# Patient Record
Sex: MALE | Race: WHITE | NOT HISPANIC OR LATINO
[De-identification: names, ages, dates, MRNs, and addresses within clinical notes are randomized per-mention and may not be internally consistent; named-entity substitution may affect disease eponyms.]

---

## 2021-04-16 ENCOUNTER — TRANSCRIPTION ENCOUNTER (OUTPATIENT)
Age: 71
End: 2021-04-16

## 2021-10-04 ENCOUNTER — TRANSCRIPTION ENCOUNTER (OUTPATIENT)
Age: 71
End: 2021-10-04

## 2021-10-15 ENCOUNTER — TRANSCRIPTION ENCOUNTER (OUTPATIENT)
Age: 71
End: 2021-10-15

## 2022-04-10 ENCOUNTER — TRANSCRIPTION ENCOUNTER (OUTPATIENT)
Age: 72
End: 2022-04-10

## 2022-04-15 ENCOUNTER — APPOINTMENT (OUTPATIENT)
Dept: CT IMAGING | Facility: HOSPITAL | Age: 72
End: 2022-04-15

## 2022-04-20 ENCOUNTER — APPOINTMENT (OUTPATIENT)
Dept: ORTHOPEDIC SURGERY | Facility: CLINIC | Age: 72
End: 2022-04-20

## 2022-05-04 ENCOUNTER — OUTPATIENT (OUTPATIENT)
Dept: OUTPATIENT SERVICES | Facility: HOSPITAL | Age: 72
LOS: 1 days | End: 2022-05-04
Payer: MEDICARE

## 2022-05-04 ENCOUNTER — APPOINTMENT (OUTPATIENT)
Dept: MRI IMAGING | Facility: CLINIC | Age: 72
End: 2022-05-04
Payer: MEDICARE

## 2022-05-04 DIAGNOSIS — Z00.00 ENCOUNTER FOR GENERAL ADULT MEDICAL EXAMINATION WITHOUT ABNORMAL FINDINGS: ICD-10-CM

## 2022-05-04 PROCEDURE — 72141 MRI NECK SPINE W/O DYE: CPT | Mod: 26,MH

## 2022-05-04 PROCEDURE — 72141 MRI NECK SPINE W/O DYE: CPT | Mod: MH

## 2022-05-14 ENCOUNTER — NON-APPOINTMENT (OUTPATIENT)
Age: 72
End: 2022-05-14

## 2022-06-19 ENCOUNTER — NON-APPOINTMENT (OUTPATIENT)
Age: 72
End: 2022-06-19

## 2022-10-02 ENCOUNTER — NON-APPOINTMENT (OUTPATIENT)
Age: 72
End: 2022-10-02

## 2022-11-30 ENCOUNTER — NON-APPOINTMENT (OUTPATIENT)
Age: 72
End: 2022-11-30

## 2023-01-30 ENCOUNTER — NON-APPOINTMENT (OUTPATIENT)
Age: 73
End: 2023-01-30

## 2023-01-31 ENCOUNTER — NON-APPOINTMENT (OUTPATIENT)
Age: 73
End: 2023-01-31

## 2023-03-22 ENCOUNTER — NON-APPOINTMENT (OUTPATIENT)
Age: 73
End: 2023-03-22

## 2023-03-27 ENCOUNTER — APPOINTMENT (OUTPATIENT)
Dept: ORTHOPEDIC SURGERY | Facility: CLINIC | Age: 73
End: 2023-03-27
Payer: MEDICARE

## 2023-03-27 VITALS — HEIGHT: 71 IN | BODY MASS INDEX: 24.64 KG/M2 | WEIGHT: 176 LBS

## 2023-03-27 VITALS — SYSTOLIC BLOOD PRESSURE: 130 MMHG | DIASTOLIC BLOOD PRESSURE: 75 MMHG

## 2023-03-27 PROCEDURE — 99204 OFFICE O/P NEW MOD 45 MIN: CPT

## 2023-03-27 RX ORDER — DICLOFENAC SODIUM 75 MG/1
75 TABLET, DELAYED RELEASE ORAL
Qty: 60 | Refills: 1 | Status: ACTIVE | COMMUNITY
Start: 2023-03-27 | End: 1900-01-01

## 2023-03-27 NOTE — ADDENDUM
[FreeTextEntry1] : This note was written by Alejandro Motley on 03/27/2023 acting as scribe for Dr. Parker Burgess M.D.\par \par I, Parker Burgess MD, have read and attest that all the information, medical decision making and discharge instructions within are true and accurate. 
[facility]

## 2023-03-27 NOTE — PHYSICAL EXAM
[Normal] : Gait: normal [Martinez's Sign] : negative Martinez's sign [Pronator Drift] : negative pronator drift [SLR] : negative straight leg raise [de-identified] : 5 out of 5 motor strength,sensation is intact and symmetrical full range of motion flexion extension and rotation, no palpatory tenderness full range of motion of hips knees shoulders and elbows (all four extremities), no atrophy, negative straight leg raise, no pathological reflexes, no swelling, normal ambulation, no apparent distress skin intact, no palpable lymph nodes, no upper or lower extremity instability, alert and oriented x 3 and normal mood. Normal finger-to nose test.\par 45 degrees left and right cervical rotation.\par No upper motor neuron findings. [de-identified] : I reviewed, interpreted and clinically correlated the following outside imaging studies, \par MR SPINE CERVICAL - ORDERED BY: ISA VILA\par \par PROCEDURE DATE: 05/04/2022\par \par INTERPRETATION: EXAMINATION:MR CERVICAL SPINE\par \par CLINICAL INDICATION:Pain, stiffness, and difficulty sleeping\par \par TECHNIQUE: Multi-planar, multi-sequence MR of the cervical spine was performed without intravenous contrast.\par \par COMPARISON: None.\par \par INTERPRETATION:\par \par BONES AND BONE MARROW: There is C6-7 endplate discogenic edema. There is a hemangioma within the dens.\par INTERVERTEBRAL DISCS: There is disc degeneration with loss of disc height at C6-7.\par ALIGNMENT: The craniocervical junction and atlantoaxial intervals are maintained. The spinal alignment is maintained.\par SPINAL CORD: The spinal cord is normal in size and signal intensity.\par EXTRASPINAL: There is no prevertebral soft tissue swelling. There is no epidural mass or fluid collection. The paraspinal and included extraspinal soft tissues are unremarkable.\par \par Evaluation of individual disc space levels demonstrates the following:\par \par C2-C3: Posterior disc osteophyte complex eccentric to the left with left uncovertebral spurring and mild left neural foraminal stenosis. No central stenosis.\par C3-C4: Posterior disc osteophyte complex eccentric to the left with moderate left facet arthropathy and uncovertebral spurring. Moderate left neural foraminal stenosis. No central stenosis..\par C4-C5: Posterior disc osteophyte complex eccentric to the right with moderate right neural foraminal stenosis. Mild left neural foraminal stenosis. Mild central stenosis.\par C5-C6: Posterior disc osteophyte complex eccentric to the right with moderate right neural foraminal stenosis. Mild left neural foraminal stenosis. Moderate central stenosis.\par C6-C7: Broad-based posterior disc osteophyte complex with mild bilateral neural foraminal stenosis. Moderate central stenosis.\par C7-T1: There is no significant spinal canal or neural foraminal stenosis.\par \par IMPRESSION:\par Multilevel cervical spondylosis with moderate central canal stenosis at C5-6 and C6-7 and mild central canal stenosis at C4-5.\par \par Mild to moderate multilevel neural foraminal stenosis as above.\par \par --- End of Report ---\par \par \par \par
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
## 2023-03-27 NOTE — DISCUSSION/SUMMARY
[de-identified] : Cervical degenerative disc disease.\par Cervical strain and sprain.\par Discussed all options.\par Diclofenac PRN.\par Cervical MRI referral.\par F/U after MRI.\par All options discussed including rest, medicine, home exercise, acupuncture, Chiropractic care, Physical Therapy, Pain management, and last resort surgery. All questions were answered, all alternatives discussed and the patient is in complete agreement with the treatment plan which the patient contributed to and discussed with me through the shared decision making process. Follow-up appointment as instructed. Any issues and the patient will call or come in sooner.

## 2023-03-30 ENCOUNTER — APPOINTMENT (OUTPATIENT)
Dept: MRI IMAGING | Facility: HOSPITAL | Age: 73
End: 2023-03-30
Payer: MEDICARE

## 2023-03-30 ENCOUNTER — OUTPATIENT (OUTPATIENT)
Dept: OUTPATIENT SERVICES | Facility: HOSPITAL | Age: 73
LOS: 1 days | End: 2023-03-30
Payer: MEDICARE

## 2023-03-30 DIAGNOSIS — Z00.00 ENCOUNTER FOR GENERAL ADULT MEDICAL EXAMINATION WITHOUT ABNORMAL FINDINGS: ICD-10-CM

## 2023-03-30 PROCEDURE — 72141 MRI NECK SPINE W/O DYE: CPT

## 2023-03-30 PROCEDURE — 72141 MRI NECK SPINE W/O DYE: CPT | Mod: 26,MH

## 2023-04-10 ENCOUNTER — APPOINTMENT (OUTPATIENT)
Dept: ORTHOPEDIC SURGERY | Facility: CLINIC | Age: 73
End: 2023-04-10
Payer: MEDICARE

## 2023-04-10 VITALS — BODY MASS INDEX: 24.78 KG/M2 | WEIGHT: 177 LBS | HEIGHT: 71 IN

## 2023-04-10 DIAGNOSIS — M47.812 SPONDYLOSIS W/OUT MYELOPATHY OR RADICULOPATHY, CERVICAL REGION: ICD-10-CM

## 2023-04-10 PROCEDURE — 99214 OFFICE O/P EST MOD 30 MIN: CPT

## 2023-08-04 ENCOUNTER — NON-APPOINTMENT (OUTPATIENT)
Age: 73
End: 2023-08-04

## 2023-08-07 ENCOUNTER — APPOINTMENT (OUTPATIENT)
Dept: RADIOLOGY | Facility: HOSPITAL | Age: 73
End: 2023-08-07
Payer: MEDICARE

## 2023-08-07 ENCOUNTER — OUTPATIENT (OUTPATIENT)
Dept: OUTPATIENT SERVICES | Facility: HOSPITAL | Age: 73
LOS: 1 days | End: 2023-08-07
Payer: MEDICARE

## 2023-08-07 DIAGNOSIS — M25.521 PAIN IN RIGHT ELBOW: ICD-10-CM

## 2023-08-07 PROCEDURE — 73080 X-RAY EXAM OF ELBOW: CPT

## 2023-08-07 PROCEDURE — 73080 X-RAY EXAM OF ELBOW: CPT | Mod: 26,RT

## 2023-08-10 ENCOUNTER — NON-APPOINTMENT (OUTPATIENT)
Age: 73
End: 2023-08-10

## 2023-09-20 ENCOUNTER — NON-APPOINTMENT (OUTPATIENT)
Age: 73
End: 2023-09-20

## 2023-09-21 ENCOUNTER — EMERGENCY (EMERGENCY)
Facility: HOSPITAL | Age: 73
LOS: 1 days | Discharge: HOME | End: 2023-09-21
Attending: EMERGENCY MEDICINE | Admitting: EMERGENCY MEDICINE
Payer: MEDICARE

## 2023-09-21 VITALS
SYSTOLIC BLOOD PRESSURE: 143 MMHG | HEIGHT: 71 IN | DIASTOLIC BLOOD PRESSURE: 83 MMHG | OXYGEN SATURATION: 95 % | HEART RATE: 64 BPM | RESPIRATION RATE: 18 BRPM | WEIGHT: 173.06 LBS | TEMPERATURE: 98 F

## 2023-09-21 PROCEDURE — 70450 CT HEAD/BRAIN W/O DYE: CPT | Mod: 26,MA

## 2023-09-21 PROCEDURE — 99284 EMERGENCY DEPT VISIT MOD MDM: CPT

## 2023-09-21 PROCEDURE — 70450 CT HEAD/BRAIN W/O DYE: CPT | Mod: MA

## 2023-09-21 PROCEDURE — 72125 CT NECK SPINE W/O DYE: CPT | Mod: MA

## 2023-09-21 PROCEDURE — 72125 CT NECK SPINE W/O DYE: CPT | Mod: 26,MA

## 2023-09-21 PROCEDURE — 99284 EMERGENCY DEPT VISIT MOD MDM: CPT | Mod: 25

## 2023-09-21 RX ORDER — METOCLOPRAMIDE HCL 10 MG
10 TABLET ORAL ONCE
Refills: 0 | Status: COMPLETED | OUTPATIENT
Start: 2023-09-21 | End: 2023-09-21

## 2023-09-21 RX ORDER — ACETAMINOPHEN 500 MG
975 TABLET ORAL ONCE
Refills: 0 | Status: COMPLETED | OUTPATIENT
Start: 2023-09-21 | End: 2023-09-21

## 2023-09-21 RX ADMIN — Medication 10 MILLIGRAM(S): at 14:05

## 2023-09-21 RX ADMIN — Medication 975 MILLIGRAM(S): at 14:05
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
## 2023-09-21 NOTE — ED PROVIDER NOTE - CARE PROVIDER_API CALL
Mauricio Freitas  Otolaryngology  [address] [place] [id number]  Phone: [phone]  Fax: [phone]  Follow Up Time:     Joanne Miner  Orthopaedic Surgery  [address] [facility], [facility] [address]  [place] [address]  Phone: [phone]  Fax: [phone]  Follow Up Time:
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
## 2023-09-21 NOTE — ED PROVIDER NOTE - PATIENT PORTAL LINK FT
You can access the FollowMyHealth Patient Portal offered by [facility] by registering at the following website: [email or web address]. By joining [email or web address]’s [email or web address] portal, you will also be able to view your health information using other applications (apps) compatible with our system.

## 2023-09-21 NOTE — ED ADULT NURSE NOTE - NSFALLUNIVINTERV_ED_ALL_ED
Bed/Stretcher in lowest position, wheels locked, appropriate side rails in place/Call bell, personal items and telephone in reach/Instruct patient to call for assistance before getting out of bed/chair/stretcher/Non-slip footwear applied when patient is off stretcher/[place] to call system/Physically safe environment - no spills, clutter or unnecessary equipment/Purposeful proactive rounding/Room/bathroom lighting operational, light cord in reach

## 2023-09-21 NOTE — ED PROVIDER NOTE - NSFOLLOWUPINSTRUCTIONS_ED_ALL_ED_FT
-- You should update your primary care physician on your Emergency Department visit and follow up with them.  If you do not have a physician or have difficulty following up, please call: [phone] to obtain a [facility] doctor or specialist who can provide follow up.    -- Return to the ER for worsening or persistent symptoms, and/or ANY NEW OR CONCERNING SYMPTOMS. -- You should update your primary care physician on your Emergency Department visit and follow up with them.  If you do not have a physician or have difficulty following up, please call: [phone] to obtain a [facility] doctor or specialist who can provide follow up.    -- You have evidence of sinusitis on your CT scan and arthritis of your neck. Follow up with ENT and ortho spine referrals.    -- Return to the ER for worsening or persistent symptoms, and/or ANY NEW OR CONCERNING SYMPTOMS.

## 2023-11-22 ENCOUNTER — EMERGENCY (EMERGENCY)
Facility: HOSPITAL | Age: 73
LOS: 1 days | Discharge: HOME | End: 2023-11-22
Attending: EMERGENCY MEDICINE | Admitting: EMERGENCY MEDICINE
Payer: MEDICARE

## 2023-11-22 VITALS
RESPIRATION RATE: 18 BRPM | HEART RATE: 68 BPM | TEMPERATURE: 98 F | DIASTOLIC BLOOD PRESSURE: 94 MMHG | SYSTOLIC BLOOD PRESSURE: 186 MMHG | WEIGHT: 175.05 LBS | OXYGEN SATURATION: 97 % | HEIGHT: 72 IN

## 2023-11-22 VITALS
RESPIRATION RATE: 18 BRPM | OXYGEN SATURATION: 98 % | HEART RATE: 72 BPM | DIASTOLIC BLOOD PRESSURE: 86 MMHG | SYSTOLIC BLOOD PRESSURE: 170 MMHG

## 2023-11-22 PROCEDURE — 73130 X-RAY EXAM OF HAND: CPT | Mod: 26,RT

## 2023-11-22 PROCEDURE — 73130 X-RAY EXAM OF HAND: CPT

## 2023-11-22 PROCEDURE — 99284 EMERGENCY DEPT VISIT MOD MDM: CPT

## 2023-11-22 RX ORDER — IBUPROFEN 200 MG
400 TABLET ORAL ONCE
Refills: 0 | Status: COMPLETED | OUTPATIENT
Start: 2023-11-22 | End: 2023-11-22

## 2023-11-22 RX ADMIN — Medication 400 MILLIGRAM(S): at 23:11

## 2023-11-22 NOTE — ED ADULT TRIAGE NOTE - HEART RATE (BEATS/MIN)
Timothy Aceves,    This is to inform you regarding your test result.    Mammogram result is satisfactory .  Recommendation: annual mammography    Sincerely,      Dr.Nasima Maria Luz MD,FACP      
68

## 2023-11-22 NOTE — ED PROVIDER NOTE - NSFOLLOWUPINSTRUCTIONS_ED_ALL_ED_FT
Contusion of hand  A contusion is a deep bruise. Contusions are the result of a blunt injury to tissues and muscle fibers under the skin. The skin overlying the contusion may turn blue, purple, or yellow. Symptoms also include pain and swelling in the injured area.  keep elevated  apply ice  Take Ibuprofen for pain  follow up with PMD     SEEK IMMEDIATE MEDICAL CARE IF YOU HAVE ANY OF THE FOLLOWING SYMPTOMS: severe pain, numbness, tingling, pain, weakness, or skin color/temperature change in any part of your body distal to the injury.

## 2023-11-22 NOTE — ED ADULT NURSE NOTE - NSFALLRISKINTERV_ED_ALL_ED

## 2023-11-22 NOTE — ED ADULT TRIAGE NOTE - CHIEF COMPLAINT QUOTE
Pt tripped and fell c/o right hand injury with pain, denies LOC no head injury no blood thinner, took Tylenol

## 2023-11-22 NOTE — ED PROVIDER NOTE - PHYSICAL EXAMINATION
General:     NAD, well-nourished, well-appearing  Head:     NC/AT, EOMI, oral mucosa moist  Neck:     supple  Lungs:     CTA b/l, no w/r/r  CVS:     S1S2, RRR, no m/g/r  Abd:     +BS, s/nt/nd, no organomegaly  Ext:    2+ radial and pedal pulses, no c/c/e  right hand no gross deformity, edema of medial aspect of hand on dorsal aspect . distal NV intact   Neuro: grossly intact

## 2023-11-22 NOTE — ED ADULT NURSE NOTE - OBJECTIVE STATEMENT
Importance of daily AREDS II study multivitamin and A[employer] Grid checks discussed with patient. Patient to follow-up immediately with any new onset of decreased vision and/or metamorphopsia. Pt presents to the ER complaining of right hand pain with pain and swelling after sustaining a trip and fall onto his outstretched hand aroung 9PM tonight. A&OX4. Pt denies SOB, chest pain, nausea, vomiting, dizziness or headache.

## 2023-11-22 NOTE — ED PROVIDER NOTE - PATIENT PORTAL LINK FT
You can access the FollowMyHealth Patient Portal offered by [facility] by registering at the following website: [email or web address]. By joining [employer]’s FollowMyHealth portal, you will also be able to view your health information using other applications (apps) compatible with our system.

## 2023-11-22 NOTE — ED ADULT TRIAGE NOTE - AS TEMP SITE
States he is concerned that hand maybe infected, states he is having no relief from pain with tylenol and ibuprofen.  
oral

## 2023-11-22 NOTE — ED ADULT NURSE NOTE - SINCE THE ILLNESS OR INJURY
Report received from Russell Medina, [facility]. Introduced myself as primary RN. Assumed care of patient. Instructed patient to call for assistance prior to getting up. Pt verbalized understanding. Call bell within reach. 7:15 PM Patient complaining of increased shortness of breath and requesting for a breathing treatment. RT notified. VS stable. O2 sats, %. Bedside and Verbal shift change report given to Benjamín Gudino (oncoming nurse) by Willy Yin (offgoing nurse). Report included the following information SBAR, Kardex, MAR, Med Rec Status and Cardiac Rhythm NSR. No

## 2023-11-22 NOTE — ED PROVIDER NOTE - CLINICAL SUMMARY MEDICAL DECISION MAKING FREE TEXT BOX
72 y/o male presents to Ed c/o pain in right hand s/p fall, he tripped and fell on outstretched hand, c/o swelling and pain on lat aspect of hand, on exam pt has edema on dorsal aspect right hand on medial aspect, xray negative for any fracture. pt advise cold compress

## 2023-11-22 NOTE — ED PROVIDER NOTE - OBJECTIVE STATEMENT
74 y/o male presents to Ed c/o pain in right hand s/p fall, he tripped and fell on outstretched hand, c/o swelling and pain on lat aspect of hand Unknown

## 2023-11-27 NOTE — CHART NOTE - NSCHARTNOTEFT_GEN_A_CORE
73 y o male presenting to the ED on 11/22 complaining of hand pain/injury.  SW made a follow up call and received no answer.  Contact information was provided via voice mail.

## 2023-12-04 ENCOUNTER — EMERGENCY (EMERGENCY)
Facility: HOSPITAL | Age: 73
LOS: 1 days | Discharge: HOME | End: 2023-12-04
Attending: STUDENT IN AN ORGANIZED HEALTH CARE EDUCATION/TRAINING PROGRAM | Admitting: STUDENT IN AN ORGANIZED HEALTH CARE EDUCATION/TRAINING PROGRAM
Payer: MEDICARE

## 2023-12-04 VITALS
WEIGHT: 175.05 LBS | DIASTOLIC BLOOD PRESSURE: 80 MMHG | HEART RATE: 63 BPM | TEMPERATURE: 98 F | RESPIRATION RATE: 18 BRPM | OXYGEN SATURATION: 96 % | HEIGHT: 72 IN | SYSTOLIC BLOOD PRESSURE: 161 MMHG

## 2023-12-04 VITALS
OXYGEN SATURATION: 97 % | HEART RATE: 57 BPM | SYSTOLIC BLOOD PRESSURE: 160 MMHG | DIASTOLIC BLOOD PRESSURE: 91 MMHG | RESPIRATION RATE: 17 BRPM | TEMPERATURE: 98 F

## 2023-12-04 PROCEDURE — 99284 EMERGENCY DEPT VISIT MOD MDM: CPT | Mod: 25

## 2023-12-04 PROCEDURE — 99284 EMERGENCY DEPT VISIT MOD MDM: CPT

## 2023-12-04 PROCEDURE — 70486 CT MAXILLOFACIAL W/O DYE: CPT | Mod: MA

## 2023-12-04 PROCEDURE — 70486 CT MAXILLOFACIAL W/O DYE: CPT | Mod: 26,MA

## 2023-12-04 NOTE — ED PROVIDER NOTE - CLINICAL SUMMARY MEDICAL DECISION MAKING FREE TEXT BOX
73 presents to the ER after right eye trauma.  On exam he has ecchymosis over the medial aspect of the right eye there is no laceration.  His extraocular movements are intact pupils are also equal round and reactive.  He is endorsing mild   Blurry vision.  Plan to evaluate with CT imaging will perform bedside sono to rule out obvious  traumatic eye pathology.
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
## 2023-12-04 NOTE — ED PROVIDER NOTE - PROGRESS NOTE DETAILS
Ramiro Gutierrez ER Attending   Bedside sonogram shows evidence of possible vitreous hemorrhage there is a small area of echogenicity in the right eye that is not attached to the retina.  spoke with ophthalmology will arrange outpatient evaluation [facility] Gutier[facility] ER Attending   Intraocular pressure 10 right visual acuity 20/25.  Spoke to on-call ophthalmologist provided with patient's insurance and phone number will call patient to schedule eye clinic follow-up as early as tomorrow.  Given return precautions back to the ER for any worsening eye complaints or any new concerning symptoms.

## 2023-12-04 NOTE — ED PROVIDER NOTE - NSFOLLOWUPINSTRUCTIONS_ED_ALL_ED_FT
Facial or Scalp Contusion  A facial or scalp contusion is a bruise (contusion) on the face or head. Contusions are the result of a direct force (blunttrauma) to the face or scalp that has caused bleeding under the skin. The contusion may turn blue, purple, or yellow (discoloration). Minor injuries may cause a painless contusion, but more severe contusions may stay painful and swollen for a few weeks.    Injuries to the face and head generally cause a lot of swelling and discoloration, especially around the eyes. Contusions by themselves are generally not life threatening. You may have a contusion along with other injuries, such as broken bones (fractures), cuts, and injuries to blood vessels.    What are the causes?  A facial or scalp contusion is caused by a injury, fall, or trauma to the face or head area. Contusions may result from:  Motor vehicle accidents.  Sports injuries.  Assault injuries.  What are the signs or symptoms?  Symptoms of this condition include:  Swelling of the injured area. The swelling may be in a small area (localized) and very noticeable.  Discoloration of the injured area.  Tenderness, soreness, or pain in the injured area.  In addition to symptoms of contusions, if you have facial fractures, you may have a change in the appearance of your nose, may not be able to close your mouth, and may have vision changes.    How is this diagnosed?  This condition is diagnosed based on your medical history and a physical exam. An X-ray exam, CT scan, or MRI may be needed to check for any additional injuries. In some cases, your health care provider may need to do more examinations of your nose, eyes, or jaw.    How is this treated?  Often, the best treatment for a facial or scalp contusion is applying cold compresses to the injured area. Over-the-counter medicines may also be recommended to help relieve pain.    If there are any cuts (lacerations), these will need to be repaired as well. Any deeper injuries may require treatment and follow up with a specialist, such as a facial surgeon or an eye specialist (ophthalmologist).    Follow these instructions at home:  Managing pain, stiffness, and swelling    Bag of ice on a towel on the skin.   If directed, put ice on the injured area. To do this:  Put ice in a plastic bag.  Place a towel between your skin and the bag.  Leave the ice on for 20 minutes, 2–3 times a day.  Remove the ice if your skin turns bright red. This is very important. If you cannot feel pain, heat, or cold, you have a greater risk of damage to the area.  Raise (elevate) the injured area above the level of your heart while you are sitting or lying down.  General instructions    Take over-the-counter and prescription medicines only as told by your health care provider.  Rest as told by your health care provider.  Return to your normal activities as told by your health care provider. Ask your health care provider what activities are safe for you.  Do not blow your nose if you have any facial fractures.  Eat soft foods if you are having jaw pain.  Keep all follow-up visits. This is important.  Contact a health care provider if:  You have trouble biting or chewing.  Your pain or swelling gets worse.  The discolored area gets much worse.  Get help right away if:  You have severe pain or a headache that is not relieved by medicine.  You have unusual sleepiness, confusion, or personality changes.  You vomit.  You have a nosebleed that does not stop.  You have double vision or blurred vision.  You have clear fluid draining from your nose or ear, and it does not go away.  You have trouble walking or using your arms or legs.  You have severe dizziness.  Summary  A facial or scalp contusion is a bruise (contusion) on the face or head.  Contusions are the result of an injury that caused bleeding and swelling under the skin.  Minor contusions will have mild symptoms, but more severe contusions may stay painful and swollen for a few weeks.  Some facial and head contusions may be associated with other more serious injuries. Go to a health care provider if you have vision changes, bleeding from your face or nose, or you are not able to to bite down normally.  Often, the best treatment for a facial or scalp contusion is applying cold compresses to the injured area.  This information is not intended to replace advice given to you by your health care provider. Make sure you discuss any questions you have with your health care provider.

## 2023-12-04 NOTE — ED ADULT TRIAGE NOTE - PAIN RATING/NUMBER SCALE (0-10): ACTIVITY
0 (no pain/absence of nonverbal indicators of pain)
Pt arrived to ED c/o asthma exacerbation x few days , probably due to smoke   Used only a pump at home with minimal relief  Pt refused labs and IV HL , refused IV meds  Dr Napier notified , oferred IM steroid but refused, spoke to PMD on the phone and will follow up tomorrow.

## 2023-12-04 NOTE — ED PROVIDER NOTE - PHYSICAL EXAMINATION
Vital signs reviewed  GENERAL: Patient nontoxic appearing, NAD  HEAD: NCAT  EYES:   Pupils equal round and reactive to light extraocular movements are intact right visual acuity 20/30.  ENT: MMM  NECK: Supple, non tender  RESPIRATORY: Normal respiratory effort. CTA B/L. No wheezing, rales, rhonchi  CARDIOVASCULAR: Regular rate and rhythm  ABDOMEN: Soft. Nondistended. Nontender. No guarding or rebound. No CVA tenderness.  MUSCULOSKELETAL/EXTREMITIES: Brisk cap refill. 2+ radial pulses. No leg edema.  SKIN:  Warm and dry  NEURO: AAOx3. No gross FND.  PSYCHIATRIC: Cooperative. Affect appropriate.

## 2023-12-04 NOTE — ED PROVIDER NOTE - PATIENT PORTAL LINK FT
You can access the FollowMyHealth Patient Portal offered by [facility] by registering at the following website: [email or web address]. By joining [employer]’s FollowMyHealth portal, you will also be able to view your health information using other applications (apps) compatible with our system. You can access the FollowMyHealth Patient Portal offered by [facility] by registering at the following website: [email or web address]. By joining [employer]’s FollowMyHealth portal, you will also be able to view your health information using other applications (apps) compatible with our system.

## 2023-12-05 ENCOUNTER — APPOINTMENT (OUTPATIENT)
Dept: OPHTHALMOLOGY | Facility: CLINIC | Age: 73
End: 2023-12-05
Payer: MEDICARE

## 2023-12-05 ENCOUNTER — NON-APPOINTMENT (OUTPATIENT)
Age: 73
End: 2023-12-05

## 2023-12-05 PROCEDURE — 92134 CPTRZ OPH DX IMG PST SGM RTA: CPT

## 2023-12-05 PROCEDURE — 92004 COMPRE OPH EXAM NEW PT 1/>: CPT

## 2023-12-07 ENCOUNTER — EMERGENCY (EMERGENCY)
Facility: HOSPITAL | Age: 73
LOS: 1 days | Discharge: HOME | End: 2023-12-07
Attending: EMERGENCY MEDICINE | Admitting: EMERGENCY MEDICINE
Payer: MEDICARE

## 2023-12-07 VITALS
DIASTOLIC BLOOD PRESSURE: 100 MMHG | RESPIRATION RATE: 18 BRPM | HEIGHT: 72 IN | SYSTOLIC BLOOD PRESSURE: 208 MMHG | WEIGHT: 175.05 LBS | HEART RATE: 67 BPM | OXYGEN SATURATION: 98 % | TEMPERATURE: 98 F

## 2023-12-07 VITALS
HEART RATE: 56 BPM | DIASTOLIC BLOOD PRESSURE: 89 MMHG | RESPIRATION RATE: 16 BRPM | SYSTOLIC BLOOD PRESSURE: 162 MMHG | OXYGEN SATURATION: 97 %

## 2023-12-07 LAB
ALBUMIN SERPL ELPH-MCNC: 3.9 G/DL — SIGNIFICANT CHANGE UP (ref 3.3–5)
ALBUMIN SERPL ELPH-MCNC: 3.9 G/DL — SIGNIFICANT CHANGE UP (ref 3.3–5)
ALP SERPL-CCNC: 79 U/L — SIGNIFICANT CHANGE UP (ref 40–120)
ALP SERPL-CCNC: 79 U/L — SIGNIFICANT CHANGE UP (ref 40–120)
ALT FLD-CCNC: 23 U/L — SIGNIFICANT CHANGE UP (ref 10–45)
ALT FLD-CCNC: 23 U/L — SIGNIFICANT CHANGE UP (ref 10–45)
ANION GAP SERPL CALC-SCNC: 11 MMOL/L — SIGNIFICANT CHANGE UP (ref 5–17)
ANION GAP SERPL CALC-SCNC: 11 MMOL/L — SIGNIFICANT CHANGE UP (ref 5–17)
APPEARANCE UR: CLEAR — SIGNIFICANT CHANGE UP
APPEARANCE UR: CLEAR — SIGNIFICANT CHANGE UP
AST SERPL-CCNC: 18 U/L — SIGNIFICANT CHANGE UP (ref 10–40)
AST SERPL-CCNC: 18 U/L — SIGNIFICANT CHANGE UP (ref 10–40)
BACTERIA # UR AUTO: ABNORMAL /HPF
BACTERIA # UR AUTO: ABNORMAL /HPF
BASOPHILS # BLD AUTO: 0.08 K/UL — SIGNIFICANT CHANGE UP (ref 0–0.2)
BASOPHILS # BLD AUTO: 0.08 K/UL — SIGNIFICANT CHANGE UP (ref 0–0.2)
BASOPHILS NFR BLD AUTO: 1.3 % — SIGNIFICANT CHANGE UP (ref 0–2)
BASOPHILS NFR BLD AUTO: 1.3 % — SIGNIFICANT CHANGE UP (ref 0–2)
BILIRUB SERPL-MCNC: 1 MG/DL — SIGNIFICANT CHANGE UP (ref 0.2–1.2)
BILIRUB SERPL-MCNC: 1 MG/DL — SIGNIFICANT CHANGE UP (ref 0.2–1.2)
BILIRUB UR-MCNC: NEGATIVE — SIGNIFICANT CHANGE UP
BILIRUB UR-MCNC: NEGATIVE — SIGNIFICANT CHANGE UP
BUN SERPL-MCNC: 21 MG/DL — SIGNIFICANT CHANGE UP (ref 7–23)
BUN SERPL-MCNC: 21 MG/DL — SIGNIFICANT CHANGE UP (ref 7–23)
CALCIUM SERPL-MCNC: 9.4 MG/DL — SIGNIFICANT CHANGE UP (ref 8.4–10.5)
CALCIUM SERPL-MCNC: 9.4 MG/DL — SIGNIFICANT CHANGE UP (ref 8.4–10.5)
CHLORIDE SERPL-SCNC: 102 MMOL/L — SIGNIFICANT CHANGE UP (ref 96–108)
CHLORIDE SERPL-SCNC: 102 MMOL/L — SIGNIFICANT CHANGE UP (ref 96–108)
CO2 SERPL-SCNC: 24 MMOL/L — SIGNIFICANT CHANGE UP (ref 22–31)
CO2 SERPL-SCNC: 24 MMOL/L — SIGNIFICANT CHANGE UP (ref 22–31)
COLOR SPEC: YELLOW — SIGNIFICANT CHANGE UP
COLOR SPEC: YELLOW — SIGNIFICANT CHANGE UP
CREAT SERPL-MCNC: 1.16 MG/DL — SIGNIFICANT CHANGE UP (ref 0.5–1.3)
CREAT SERPL-MCNC: 1.16 MG/DL — SIGNIFICANT CHANGE UP (ref 0.5–1.3)
DIFF PNL FLD: ABNORMAL
DIFF PNL FLD: ABNORMAL
EGFR: 66 ML/MIN/1.73M2 — SIGNIFICANT CHANGE UP
EGFR: 66 ML/MIN/1.73M2 — SIGNIFICANT CHANGE UP
EOSINOPHIL # BLD AUTO: 0.15 K/UL — SIGNIFICANT CHANGE UP (ref 0–0.5)
EOSINOPHIL # BLD AUTO: 0.15 K/UL — SIGNIFICANT CHANGE UP (ref 0–0.5)
EOSINOPHIL NFR BLD AUTO: 2.5 % — SIGNIFICANT CHANGE UP (ref 0–6)
EOSINOPHIL NFR BLD AUTO: 2.5 % — SIGNIFICANT CHANGE UP (ref 0–6)
EPI CELLS # UR: 1 — SIGNIFICANT CHANGE UP
EPI CELLS # UR: 1 — SIGNIFICANT CHANGE UP
GLUCOSE SERPL-MCNC: 187 MG/DL — HIGH (ref 70–99)
GLUCOSE SERPL-MCNC: 187 MG/DL — HIGH (ref 70–99)
GLUCOSE UR QL: NEGATIVE MG/DL — SIGNIFICANT CHANGE UP
GLUCOSE UR QL: NEGATIVE MG/DL — SIGNIFICANT CHANGE UP
HCT VFR BLD CALC: 48.4 % — SIGNIFICANT CHANGE UP (ref 39–50)
HCT VFR BLD CALC: 48.4 % — SIGNIFICANT CHANGE UP (ref 39–50)
HGB BLD-MCNC: 16.4 G/DL — SIGNIFICANT CHANGE UP (ref 13–17)
HGB BLD-MCNC: 16.4 G/DL — SIGNIFICANT CHANGE UP (ref 13–17)
IMM GRANULOCYTES NFR BLD AUTO: 0.2 % — SIGNIFICANT CHANGE UP (ref 0–0.9)
IMM GRANULOCYTES NFR BLD AUTO: 0.2 % — SIGNIFICANT CHANGE UP (ref 0–0.9)
KETONES UR-MCNC: NEGATIVE MG/DL — SIGNIFICANT CHANGE UP
KETONES UR-MCNC: NEGATIVE MG/DL — SIGNIFICANT CHANGE UP
LEUKOCYTE ESTERASE UR-ACNC: NEGATIVE — SIGNIFICANT CHANGE UP
LEUKOCYTE ESTERASE UR-ACNC: NEGATIVE — SIGNIFICANT CHANGE UP
LIDOCAIN IGE QN: 58 U/L — SIGNIFICANT CHANGE UP (ref 16–77)
LIDOCAIN IGE QN: 58 U/L — SIGNIFICANT CHANGE UP (ref 16–77)
LYMPHOCYTES # BLD AUTO: 1.06 K/UL — SIGNIFICANT CHANGE UP (ref 1–3.3)
LYMPHOCYTES # BLD AUTO: 1.06 K/UL — SIGNIFICANT CHANGE UP (ref 1–3.3)
LYMPHOCYTES # BLD AUTO: 17.7 % — SIGNIFICANT CHANGE UP (ref 13–44)
LYMPHOCYTES # BLD AUTO: 17.7 % — SIGNIFICANT CHANGE UP (ref 13–44)
MCHC RBC-ENTMCNC: 30.7 PG — SIGNIFICANT CHANGE UP (ref 27–34)
MCHC RBC-ENTMCNC: 30.7 PG — SIGNIFICANT CHANGE UP (ref 27–34)
MCHC RBC-ENTMCNC: 33.9 GM/DL — SIGNIFICANT CHANGE UP (ref 32–36)
MCHC RBC-ENTMCNC: 33.9 GM/DL — SIGNIFICANT CHANGE UP (ref 32–36)
MCV RBC AUTO: 90.5 FL — SIGNIFICANT CHANGE UP (ref 80–100)
MCV RBC AUTO: 90.5 FL — SIGNIFICANT CHANGE UP (ref 80–100)
MONOCYTES # BLD AUTO: 0.48 K/UL — SIGNIFICANT CHANGE UP (ref 0–0.9)
MONOCYTES # BLD AUTO: 0.48 K/UL — SIGNIFICANT CHANGE UP (ref 0–0.9)
MONOCYTES NFR BLD AUTO: 8 % — SIGNIFICANT CHANGE UP (ref 2–14)
MONOCYTES NFR BLD AUTO: 8 % — SIGNIFICANT CHANGE UP (ref 2–14)
NEUTROPHILS # BLD AUTO: 4.21 K/UL — SIGNIFICANT CHANGE UP (ref 1.8–7.4)
NEUTROPHILS # BLD AUTO: 4.21 K/UL — SIGNIFICANT CHANGE UP (ref 1.8–7.4)
NEUTROPHILS NFR BLD AUTO: 70.3 % — SIGNIFICANT CHANGE UP (ref 43–77)
NEUTROPHILS NFR BLD AUTO: 70.3 % — SIGNIFICANT CHANGE UP (ref 43–77)
NITRITE UR-MCNC: NEGATIVE — SIGNIFICANT CHANGE UP
NITRITE UR-MCNC: NEGATIVE — SIGNIFICANT CHANGE UP
NRBC # BLD: 0 /100 WBCS — SIGNIFICANT CHANGE UP (ref 0–0)
NRBC # BLD: 0 /100 WBCS — SIGNIFICANT CHANGE UP (ref 0–0)
PH UR: 5.5 — SIGNIFICANT CHANGE UP (ref 5–8)
PH UR: 5.5 — SIGNIFICANT CHANGE UP (ref 5–8)
PLATELET # BLD AUTO: 188 K/UL — SIGNIFICANT CHANGE UP (ref 150–400)
PLATELET # BLD AUTO: 188 K/UL — SIGNIFICANT CHANGE UP (ref 150–400)
POTASSIUM SERPL-MCNC: 4.3 MMOL/L — SIGNIFICANT CHANGE UP (ref 3.5–5.3)
POTASSIUM SERPL-MCNC: 4.3 MMOL/L — SIGNIFICANT CHANGE UP (ref 3.5–5.3)
POTASSIUM SERPL-SCNC: 4.3 MMOL/L — SIGNIFICANT CHANGE UP (ref 3.5–5.3)
POTASSIUM SERPL-SCNC: 4.3 MMOL/L — SIGNIFICANT CHANGE UP (ref 3.5–5.3)
PROT SERPL-MCNC: 7.4 G/DL — SIGNIFICANT CHANGE UP (ref 6–8.3)
PROT SERPL-MCNC: 7.4 G/DL — SIGNIFICANT CHANGE UP (ref 6–8.3)
PROT UR-MCNC: SIGNIFICANT CHANGE UP MG/DL
PROT UR-MCNC: SIGNIFICANT CHANGE UP MG/DL
RBC # BLD: 5.35 M/UL — SIGNIFICANT CHANGE UP (ref 4.2–5.8)
RBC # BLD: 5.35 M/UL — SIGNIFICANT CHANGE UP (ref 4.2–5.8)
RBC # FLD: 12.8 % — SIGNIFICANT CHANGE UP (ref 10.3–14.5)
RBC # FLD: 12.8 % — SIGNIFICANT CHANGE UP (ref 10.3–14.5)
RBC CASTS # UR COMP ASSIST: 26 /HPF — HIGH (ref 0–4)
RBC CASTS # UR COMP ASSIST: 26 /HPF — HIGH (ref 0–4)
SODIUM SERPL-SCNC: 137 MMOL/L — SIGNIFICANT CHANGE UP (ref 135–145)
SODIUM SERPL-SCNC: 137 MMOL/L — SIGNIFICANT CHANGE UP (ref 135–145)
SP GR SPEC: 1.02 — SIGNIFICANT CHANGE UP (ref 1–1.03)
SP GR SPEC: 1.02 — SIGNIFICANT CHANGE UP (ref 1–1.03)
UROBILINOGEN FLD QL: 0.2 MG/DL — SIGNIFICANT CHANGE UP (ref 0.2–1)
UROBILINOGEN FLD QL: 0.2 MG/DL — SIGNIFICANT CHANGE UP (ref 0.2–1)
WBC # BLD: 5.99 K/UL — SIGNIFICANT CHANGE UP (ref 3.8–10.5)
WBC # BLD: 5.99 K/UL — SIGNIFICANT CHANGE UP (ref 3.8–10.5)
WBC # FLD AUTO: 5.99 K/UL — SIGNIFICANT CHANGE UP (ref 3.8–10.5)
WBC # FLD AUTO: 5.99 K/UL — SIGNIFICANT CHANGE UP (ref 3.8–10.5)
WBC UR QL: 1 /HPF — SIGNIFICANT CHANGE UP (ref 0–5)
WBC UR QL: 1 /HPF — SIGNIFICANT CHANGE UP (ref 0–5)

## 2023-12-07 PROCEDURE — 74174 CTA ABD&PLVS W/CONTRAST: CPT | Mod: MA

## 2023-12-07 PROCEDURE — 96374 THER/PROPH/DIAG INJ IV PUSH: CPT | Mod: XU

## 2023-12-07 PROCEDURE — 36415 COLL VENOUS BLD VENIPUNCTURE: CPT

## 2023-12-07 PROCEDURE — 71275 CT ANGIOGRAPHY CHEST: CPT | Mod: MA

## 2023-12-07 PROCEDURE — 87086 URINE CULTURE/COLONY COUNT: CPT

## 2023-12-07 PROCEDURE — 83690 ASSAY OF LIPASE: CPT

## 2023-12-07 PROCEDURE — 71275 CT ANGIOGRAPHY CHEST: CPT | Mod: 26,MA

## 2023-12-07 PROCEDURE — 81001 URINALYSIS AUTO W/SCOPE: CPT

## 2023-12-07 PROCEDURE — 99285 EMERGENCY DEPT VISIT HI MDM: CPT

## 2023-12-07 PROCEDURE — 96375 TX/PRO/DX INJ NEW DRUG ADDON: CPT | Mod: XU

## 2023-12-07 PROCEDURE — 99284 EMERGENCY DEPT VISIT MOD MDM: CPT | Mod: 25

## 2023-12-07 PROCEDURE — 85025 COMPLETE CBC W/AUTO DIFF WBC: CPT

## 2023-12-07 PROCEDURE — 80053 COMPREHEN METABOLIC PANEL: CPT

## 2023-12-07 PROCEDURE — 74174 CTA ABD&PLVS W/CONTRAST: CPT | Mod: 26,MA

## 2023-12-07 RX ORDER — METFORMIN HYDROCHLORIDE 850 MG/1
1 TABLET ORAL
Refills: 0 | DISCHARGE

## 2023-12-07 RX ORDER — KETOROLAC TROMETHAMINE 30 MG/ML
15 SYRINGE (ML) INJECTION ONCE
Refills: 0 | Status: DISCONTINUED | OUTPATIENT
Start: 2023-12-07 | End: 2023-12-07

## 2023-12-07 RX ORDER — ONDANSETRON 8 MG/1
1 TABLET, FILM COATED ORAL
Qty: 1 | Refills: 0
Start: 2023-12-07 | End: 2023-12-09

## 2023-12-07 RX ORDER — OXYCODONE AND ACETAMINOPHEN 5; 325 MG/1; MG/1
1 TABLET ORAL
Qty: 16 | Refills: 0
Start: 2023-12-07 | End: 2023-12-10

## 2023-12-07 RX ORDER — IBUPROFEN 200 MG
1 TABLET ORAL
Qty: 20 | Refills: 0
Start: 2023-12-07 | End: 2023-12-11

## 2023-12-07 RX ORDER — TAMSULOSIN HYDROCHLORIDE 0.4 MG/1
1 CAPSULE ORAL
Qty: 30 | Refills: 0
Start: 2023-12-07 | End: 2024-01-05

## 2023-12-07 RX ORDER — SODIUM CHLORIDE 9 MG/ML
1000 INJECTION INTRAMUSCULAR; INTRAVENOUS; SUBCUTANEOUS ONCE
Refills: 0 | Status: COMPLETED | OUTPATIENT
Start: 2023-12-07 | End: 2023-12-07

## 2023-12-07 RX ORDER — MORPHINE SULFATE 50 MG/1
4 CAPSULE, EXTENDED RELEASE ORAL ONCE
Refills: 0 | Status: DISCONTINUED | OUTPATIENT
Start: 2023-12-07 | End: 2023-12-07

## 2023-12-07 RX ADMIN — Medication 15 MILLIGRAM(S): at 10:55

## 2023-12-07 RX ADMIN — Medication 15 MILLIGRAM(S): at 10:34

## 2023-12-07 RX ADMIN — MORPHINE SULFATE 4 MILLIGRAM(S): 50 CAPSULE, EXTENDED RELEASE ORAL at 10:21

## 2023-12-07 RX ADMIN — MORPHINE SULFATE 4 MILLIGRAM(S): 50 CAPSULE, EXTENDED RELEASE ORAL at 10:55

## 2023-12-07 RX ADMIN — SODIUM CHLORIDE 1000 MILLILITER(S): 9 INJECTION INTRAMUSCULAR; INTRAVENOUS; SUBCUTANEOUS at 10:21

## 2023-12-07 RX ADMIN — SODIUM CHLORIDE 1000 MILLILITER(S): 9 INJECTION INTRAMUSCULAR; INTRAVENOUS; SUBCUTANEOUS at 11:12

## 2023-12-07 NOTE — ED ADULT NURSE REASSESSMENT NOTE - NS ED NURSE REASSESS COMMENT FT1
Pt verbalized improvement of symptoms. NAD. Awaiting CT results. Pt aware. Will continue to monitor.
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
## 2023-12-07 NOTE — ED ADULT NURSE NOTE - OBJECTIVE STATEMENT
Pt a&ox4 ambulatory to ED c/o sudden onset, intermittent left sided abdominal pain radiating to left flank x today. Pt with h/o kidney stones. +CVA tenderness to left. Abdomen soft, nondistended, tender to LLQ upon palpation. Denies dysuria or hematuria. Denies fever, chills, vomiting, cp, sob.

## 2023-12-07 NOTE — ED PROVIDER NOTE - OBJECTIVE STATEMENT
73-year-old male presents to the emergency department reporting left flank pain and abdominal pain.  Patient states it starts in the mid abdomen and radiates to the left lower back.  Remote past medical history of kidney stones.  No injury reported.  No vomiting.  Positive nausea.  No diarrhea.  History of diabetes.  No dysuria

## 2023-12-07 NOTE — ED ADULT NURSE NOTE - NSFALLUNIVINTERV_ED_ALL_ED
Bed/Stretcher in lowest position, wheels locked, appropriate side rails in place/Call bell, personal items and telephone in reach/Instruct patient to call for assistance before getting out of bed/chair/stretcher/Non-slip footwear applied when patient is off stretcher/[place] to call system/Physically safe environment - no spills, clutter or unnecessary equipment/Purposeful proactive rounding/Room/bathroom lighting operational, light cord in reach Bed/Stretcher in lowest position, wheels locked, appropriate side rails in place/Call bell, personal items and telephone in reach/Instruct patient to call for assistance before getting out of bed/chair/stretcher/Non-slip footwear applied when patient is off stretcher/[place] to call system/Physically safe environment - no spills, clutter or unnecessary equipment/Purposeful proactive rounding/Room/bathroom lighting operational, light cord in reach

## 2023-12-07 NOTE — ED PROVIDER NOTE - PATIENT PORTAL LINK FT
You can access the FollowMyHealth Patient Portal offered by [facility] by registering at the following website: [email or web address]. By joining [employer]’s FollowMyHealth portal, you will also be able to view your health information using other applications (apps) compatible with our system. You can access the FollowMyHealth Patient Portal offered by [facility] by registering at the following website: [email or web address]. By joining [facility]’s FollowMyHealth portal, you will also be able to view your health information using other applications (apps) compatible with our system.

## 2023-12-07 NOTE — CHART NOTE - NSCHARTNOTEFT_GEN_A_CORE
SW met with the pt and family at bedside to assess for social work needs and discuss home assessment of safety.  Pt is a 72 y/o male presented Ed for flank pain.    Emergency Department Social Work Geriatric Initial Assessment    Cognitive Mental Status -Alert and oriented   Primary Caregiver Information –Wife, Sahara Ta [phone]  Emergency Contact Information –Wife, Sahara Ta [phone]  Primary Care Physician / Specialists - Dr. Audi Pierre [phone]   Functional Status Prior to ED Visit - Fully independent in all activities.  Family and Social Support – Family is supportive   Living Arrangement -Living with his wife in private house.  Services Present on Admission – Pt used to schedule and attend appt  Assistive Devices (Durable Medical Equipment) –None  ADLs & Degree of [place] – Fully independent  Barriers to obtain medications -none  Barriers to attend medical appointments -none  ISAR Score – 0 non significant   Advanced Directives – none  Follow up care – Attending has recommended for urology follow up appt and worker was able to schedule a urology appt with support to  with Dorothy Thompson on 12/11/23.  Pt wants to schedule own appt with the primary.  Discharge Transportation – Family   Summary/Recommendations/Referrals -Pt has not expressed any safety issues at home and continues to have supportive family.  SW continues to be available for further assistance. SW met with the pt and family at bedside to assess for social work needs and discuss home assessment of safety.  Pt is a 74 y/o male presented Ed for flank pain.    Emergency Department Social Work Geriatric Initial Assessment    Cognitive Mental Status -Alert and oriented   Primary Caregiver Information –Wife, Sahara Ta [phone]  Emergency Contact Information –Wife, Sahara Ta [phone]  Primary Care Physician / Specialists - Dr. Audi Pierre [phone]   Functional Status Prior to ED Visit - Fully independent in all activities.  Family and Social Support – Family is supportive   Living Arrangement -Living with his wife in private house.  Services Present on Admission – Pt used to schedule and attend appt  Assistive Devices (Durable Medical Equipment) –None  ADLs & Degree of [place] – Fully independent  Barriers to obtain medications -none  Barriers to attend medical appointments -none  ISAR Score – 0 non significant   Advanced Directives – none  Follow up care – Attending has recommended for urology follow up appt and worker was able to schedule a urology appt with support to  with Dorothy Thompson on 12/11/23.  Pt wants to schedule own appt with the primary.  Discharge Transportation – Family   Summary/Recommendations/Referrals -Pt has not expressed any safety issues at home and continues to have supportive family.  SW continues to be available for further assistance.

## 2023-12-07 NOTE — ED PROVIDER NOTE - CLINICAL SUMMARY MEDICAL DECISION MAKING FREE TEXT BOX
73-year-old male presents to the emergency department reporting left flank pain and abdominal pain.  Patient states it starts in the mid abdomen and radiates to the left lower back.  Remote past medical history of kidney stones.  No injury reported.  No vomiting.  Positive nausea.  No diarrhea.  History of diabetes.  No dysuria.   Exam as stated. Plan for labs with CT.   Toradol/Morphine and fluid ordered.     After noting pt vital signs, consideration placed for Ao. Diss. Will change CT to CTA and fully assess.     Results reviewed. No Ao. Diss.   + 4mm stone noted to left proximal ureter.

## 2023-12-08 NOTE — CHART NOTE - NSCHARTNOTEFT_GEN_A_CORE
73 y o male presenting to the ED on 12/4 complaining of eye pain/injury.  As per [facility], the patient had a scheduled opthalmology appointment with Dr. Quevedo on 12/5/23 @ 11 am. 73 y o male presenting to the ED on 12/4 complaining of eye pain/injury.  As per [facility], the patient had a scheduled opthalmology appointment with Dr. Quevedo on 12/5/23 @ 11 am.

## 2023-12-09 LAB
CULTURE RESULTS: SIGNIFICANT CHANGE UP
CULTURE RESULTS: SIGNIFICANT CHANGE UP
SPECIMEN SOURCE: SIGNIFICANT CHANGE UP
SPECIMEN SOURCE: SIGNIFICANT CHANGE UP

## 2023-12-11 ENCOUNTER — APPOINTMENT (OUTPATIENT)
Dept: UROLOGY | Facility: CLINIC | Age: 73
End: 2023-12-11
Payer: MEDICARE

## 2023-12-11 VITALS
BODY MASS INDEX: 24.5 KG/M2 | TEMPERATURE: 97.5 F | WEIGHT: 175 LBS | DIASTOLIC BLOOD PRESSURE: 75 MMHG | OXYGEN SATURATION: 98 % | HEIGHT: 71 IN | HEART RATE: 75 BPM | SYSTOLIC BLOOD PRESSURE: 128 MMHG

## 2023-12-11 DIAGNOSIS — N52.9 MALE ERECTILE DYSFUNCTION, UNSPECIFIED: ICD-10-CM

## 2023-12-11 DIAGNOSIS — R73.03 PREDIABETES.: ICD-10-CM

## 2023-12-11 DIAGNOSIS — N20.1 CALCULUS OF URETER: ICD-10-CM

## 2023-12-11 DIAGNOSIS — Z87.442 PERSONAL HISTORY OF URINARY CALCULI: ICD-10-CM

## 2023-12-11 DIAGNOSIS — N23 UNSPECIFIED RENAL COLIC: ICD-10-CM

## 2023-12-11 DIAGNOSIS — Z80.3 FAMILY HISTORY OF MALIGNANT NEOPLASM OF BREAST: ICD-10-CM

## 2023-12-11 PROCEDURE — 99204 OFFICE O/P NEW MOD 45 MIN: CPT

## 2023-12-11 RX ORDER — METFORMIN HYDROCHLORIDE 625 MG/1
TABLET ORAL
Refills: 0 | Status: ACTIVE | COMMUNITY

## 2023-12-11 RX ORDER — SILDENAFIL 20 MG/1
20 TABLET ORAL
Qty: 30 | Refills: 1 | Status: COMPLETED | COMMUNITY
Start: 2023-12-11 | End: 2024-02-09

## 2023-12-13 ENCOUNTER — APPOINTMENT (OUTPATIENT)
Dept: MRI IMAGING | Facility: HOSPITAL | Age: 73
End: 2023-12-13

## 2023-12-23 ENCOUNTER — APPOINTMENT (OUTPATIENT)
Dept: MRI IMAGING | Facility: HOSPITAL | Age: 73
End: 2023-12-23
Payer: MEDICARE

## 2023-12-23 ENCOUNTER — OUTPATIENT (OUTPATIENT)
Dept: OUTPATIENT SERVICES | Facility: HOSPITAL | Age: 73
LOS: 1 days | End: 2023-12-23
Payer: MEDICARE

## 2023-12-23 DIAGNOSIS — M54.12 RADICULOPATHY, CERVICAL REGION: ICD-10-CM

## 2023-12-23 PROCEDURE — 70543 MRI ORBT/FAC/NCK W/O &W/DYE: CPT

## 2023-12-23 PROCEDURE — 70543 MRI ORBT/FAC/NCK W/O &W/DYE: CPT | Mod: 26,MH

## 2023-12-23 PROCEDURE — A9579: CPT

## 2024-01-02 ENCOUNTER — NON-APPOINTMENT (OUTPATIENT)
Age: 74
End: 2024-01-02

## 2024-01-04 LAB
KIDNEY STONE SOURCE: NORMAL
NIDUS STONE QN: NORMAL
RESULT COMMENT: NORMAL

## 2024-01-11 ENCOUNTER — APPOINTMENT (OUTPATIENT)
Dept: UROLOGY | Facility: CLINIC | Age: 74
End: 2024-01-11

## 2024-01-11 NOTE — HISTORY OF PRESENT ILLNESS
[FreeTextEntry1] : Referring Provider: ER Chief Complaint: Left renal colic  Date of first visit: 12/11/2023     AKSHAT JOHNSON is a 73 year old  race man with a PMHx of  remote nephrolithiasis who presents for evaluation of left ureteral stone. On 12/7/23  he was evaluated at [facility] for left flank pain and was dx with 4mm left proximal stone. Lab work and urine were without significant results. Ucx was negatiuve. He was discharged with medical expulsive therapy. He has not passed the stone since. Has not taken Flomax. He has required oxycodone for two days post evaluation. Since then he has not had significant pain. There was no fever/ chills/ nausea/ vomiting/ changes in urination - freq, urgenecy, dysuria, gross hematuria  CT Results:  KIDNEYS/URETERS: Mild left-sided hydronephrosis and perinephric stranding  secondary to a 4 mm sized calculus within the proximal left ureter, just  above L3-4 disc level.  At baseline:  has nocturia 1x per pm. Drinks coffee in the evening after dinner. Good stream feels completely empty; does not take medication  has tried Viagra in the past with unknown dose; desires better erections PAT 16  PSA Hx 0.72     3/2023    Biopsy Hx n/a    MRI Hx n/a      12/11/2023 IPSS 5 QOL 2 PAT 16      PMHx: nephrolithiasis SxHx: rotator cuff  FHx: no gu malignancy, mother breast ca  SocHx: three children; seldom etoh, no tob  Allergies: nkda   The patient denies fevers, chills, nausea and or vomiting and no unexplained weight loss. All pertinent laboratory, films and physician notes were reviewed.  Questionnaire results were discussed with patient. [Urinary Incontinence] : no urinary incontinence [Urinary Retention] : no urinary retention [Urinary Urgency] : no urinary urgency [Urinary Frequency] : no urinary frequency [Weak Stream] : no weak stream [Dysuria] : no dysuria [Hematuria - Gross] : no gross hematuria

## 2024-01-11 NOTE — ASSESSMENT
[FreeTextEntry1] : 74 yo male here with left ureteral stone causing renal colic necessitating ER eval. Has been pain free and without fevers. Urine culture from ER was negative.   Left ureteral stone - Labs in HIE reviewed, no leukocytosis, no UTI - CT images reviewed with patient - Will continue medical expulsive therapy for one month and if has not passed stone will then require reimaging - Start Flomax. The patient understands that this medication may cause dizziness, retrograde ejaculation or nasal congestion among other complications. I recommended that the patient take this medication at night. If the side effects become too bothersome, the medication can be discontinued. - Increase hydration  - Continue straining urine  Nocturia  - Will eval if Flomax has been helpful - Lifestyle modifications such as limiting nighttime liquid intake, especially bladder irritants.   Erectile Dysfunction - Trial Viagra - Based on the patient's history, he was prescribed Sildenafil  The patient understands that the risks of this medication include facial redness, flushing, GERD, back pain, priapism, chest pain/MI, dizziness, drop in BP, loss of vision, blurry vision and loss of hearing. He has no history of unstable angina, SOB on exertion and or chest pain. The patient has been screened for potential medication interactions. He is not on any po antifungals or HIV meds. He is on alpha blockers, though has been instructed to not take the medications within 4-6 hours of each other.  I recommended that the patient take the first dose by himself. He knows that the medication may take up to 45 minutes to work (or longer on a full stomach) and requires sexual stimulation. He will come to the ER for priapism, chest pain, or loss of vision or hearing. The patient understood all of these instructions. He will follow up in 1 month  Prescription was sent to the pharmacy - [facility]  Take pill only on days when you want an erection. The pill should be taken at least an hour prior to attempting to initiating erections. Avoid food for an hour before and after taking the pill, since food will interfere with absorption and the pill will be less effective. Once the pill is working, you have a window of 10-12 hours during which it should help you but only if you are sexually stimulated. The most common side effects (not experienced by everyone) are headache, facial flushing, sensation that your heart is pounding, nasal congestion, and blue-tinted vision. You can take an ibuprofen if needed. These side effects are self-limiting and will pass. Most men who have side effects notice that they significantly diminish with repeated use of the medication. If you have an erection lasting 2 hours (in the absence of sexual stimulation) take over the counter Sudafed to counteract it. At 3 hours, you should head to the Emergency Department.    Dorothy Ferrari MD Chief of Urology, [address], [facility] [address] [place] [address] Phone: [phone] Fax: [phone]

## 2024-01-28 ENCOUNTER — NON-APPOINTMENT (OUTPATIENT)
Age: 74
End: 2024-01-28

## 2024-02-09 RX ORDER — TAMSULOSIN HYDROCHLORIDE 0.4 MG/1
0.4 CAPSULE ORAL
Qty: 30 | Refills: 1 | Status: DISCONTINUED | COMMUNITY
Start: 2023-12-11 | End: 2024-02-09

## 2024-03-07 ENCOUNTER — RX RENEWAL (OUTPATIENT)
Age: 74
End: 2024-03-07

## 2024-03-19 ENCOUNTER — NON-APPOINTMENT (OUTPATIENT)
Age: 74
End: 2024-03-19

## 2024-04-06 ENCOUNTER — NON-APPOINTMENT (OUTPATIENT)
Age: 74
End: 2024-04-06

## 2024-06-06 ENCOUNTER — APPOINTMENT (OUTPATIENT)
Dept: OPHTHALMOLOGY | Facility: CLINIC | Age: 74
End: 2024-06-06

## 2024-06-16 ENCOUNTER — NON-APPOINTMENT (OUTPATIENT)
Age: 74
End: 2024-06-16

## 2024-06-19 ENCOUNTER — NON-APPOINTMENT (OUTPATIENT)
Age: 74
End: 2024-06-19

## 2024-06-20 ENCOUNTER — EMERGENCY (EMERGENCY)
Facility: HOSPITAL | Age: 74
LOS: 1 days | Discharge: HOME | End: 2024-06-20
Attending: EMERGENCY MEDICINE | Admitting: EMERGENCY MEDICINE
Payer: MEDICARE

## 2024-06-20 VITALS
HEIGHT: 71 IN | HEART RATE: 75 BPM | OXYGEN SATURATION: 96 % | DIASTOLIC BLOOD PRESSURE: 85 MMHG | WEIGHT: 179.9 LBS | RESPIRATION RATE: 18 BRPM | SYSTOLIC BLOOD PRESSURE: 138 MMHG | TEMPERATURE: 98 F

## 2024-06-20 PROCEDURE — 99284 EMERGENCY DEPT VISIT MOD MDM: CPT

## 2024-06-20 PROCEDURE — 96372 THER/PROPH/DIAG INJ SC/IM: CPT

## 2024-06-20 PROCEDURE — 99283 EMERGENCY DEPT VISIT LOW MDM: CPT

## 2024-06-20 RX ORDER — CETIRIZINE HYDROCHLORIDE 10 MG/1
1 TABLET ORAL
Qty: 7 | Refills: 0
Start: 2024-06-20 | End: 2024-06-26

## 2024-06-20 RX ORDER — DEXAMETHASONE 0.5 MG/5ML
4 ELIXIR ORAL ONCE
Refills: 0 | Status: COMPLETED | OUTPATIENT
Start: 2024-06-20 | End: 2024-06-20

## 2024-06-20 RX ADMIN — Medication 4 MILLIGRAM(S): at 10:32

## 2024-06-20 NOTE — ED PROVIDER NOTE - NS ED ROS FT
Review of Systems:  	•	CONSTITUTIONAL - no fever, no diaphoresis, no weight change  	•	SKIN - +rash  	•	HEMATOLOGIC - no bleeding, no bruising  	•	EYES - no eye pain, no blurred vision  	•	ENT - no change in hearing, no pain  	•	RESPIRATORY - no shortness of breath, no cough  	•	CARDIAC - no chest pain, no palpitations  	•	GI - no abd pain, no nausea, no vomiting, no diarrhea, no constipation, no bleeding  	•	GENITO-URINARY -no dysuria; no hematuria   	•	MUSCULOSKELETAL - no joint paint, no swelling, no redness  	•	NEUROLOGIC - no weakness, no headache, no anesthesia, no paresthesias

## 2024-06-20 NOTE — ED PROVIDER NOTE - CLINICAL SUMMARY MEDICAL DECISION MAKING FREE TEXT BOX
This patient presents with symptoms consistent with acute hypersensitivity reaction, likely acute Contact dermatitis. Presentation not consistent with acute anaphylaxis (lack of pulmonary, dermatologic, cardiovascular or GI symptoms, lack of hypotension or exposure to known allergen), angioedema, serum sickness (no recent drug exposure, lacks fevers, arthralgias). No evidence of airway compromise or shock at this time. Patient improved with H1/H2 blockers, steroids. No need for epinephrine.

## 2024-06-20 NOTE — ED PROVIDER NOTE - PHYSICAL EXAMINATION
ATTENDING PHYSICAL EXAM DR. MCMAHON ***GEN - NAD; well appearing; A+O x3 ***HEAD - NC/AT ***EYES/NOSE - PERRL, EOMI, mucous membranes moist, no discharge ***THROAT: Oral cavity and pharynx normal. No inflammation, swelling, exudate, or lesions.  ***NECK: Neck supple, non-tender without lymphadenopathy, no masses, no thyromegaly.   ***PULMONARY - CTA b/l, symmetric breath sounds. ***CARDIAC -s1s2, RRR, no M,G,R  ***ABDOMEN - +BS, ND, NT, soft, no guarding, no rebound, no masses   ***BACK - no CVA tenderness, Normal  spine ***EXTREMITIES - symmetric pulses, 2+ dp, capillary refill < 2 seconds, no clubbing, no cyanosis, no edema ***SKIN - diffuse maculopapular rash,   No palm sole involvement, no mucocutaneous involvement.  ***NEUROLOGIC - alert

## 2024-06-20 NOTE — ED ADULT NURSE NOTE - OBJECTIVE STATEMENT
Pt a&ox4 ambulatory to ED c/o itching all over body. Pt was exposed to poison ivy and has been using OTC medications with minimal relief. Pt denies tongue itching, denies facial swelling or difficulty swallowing or breathing.
normal...

## 2024-06-20 NOTE — ED ADULT TRIAGE NOTE - CHIEF COMPLAINT QUOTE
Patient presents to ED from home complaining diffuse urticaria from poison ivy x4 days. Patient unable to sleep. Denies SOB or difficulty swallowing.

## 2024-06-20 NOTE — ED PROVIDER NOTE - NSFOLLOWUPINSTRUCTIONS_ED_ALL_ED_FT
Monitor your symptoms if there is no improvement by tomorrow begin Medrol Dosepak, Zyrtec daily as prescribed.    English    Poison Ivy Dermatitis  Poison ivy dermatitis is redness and soreness of the skin caused by chemicals in the leaves of the poison ivy plant. You may have very bad itching, swelling, a rash, and blisters.    What are the causes?  Touching a poison ivy plant.  Touching something that has the chemical on it. This may include animals or objects that have come in contact with the plant.  What increases the risk?  Going outdoors often in wooded or marshy areas.  Going outdoors without wearing protective clothing, such as closed shoes, long pants, and a long-sleeved shirt.  What are the signs or symptoms?  A hand with red patches and a close-up of white blisters.  Skin redness.  Very bad itching.  A rash that often includes bumps and blisters.  The rash usually appears 48 hours after exposure, if you have had it before.  If this is the first time you have it, the rash may not appear until a week after exposure.  Swelling. This may occur if the reaction is very bad.  Symptoms usually last for 1–2 weeks. The first time you get this condition, symptoms may last 3–4 weeks.    How is this treated?  This condition may be treated with:  Hydrocortisone cream or calamine lotion to relieve itching.  Oatmeal baths to soothe the skin.  Medicines, such as over-the-counter antihistamine tablets.  Oral steroid medicine for very bad reactions.  Follow these instructions at home:  Medicines    Take or apply over-the-counter and prescription medicines only as told by your doctor.  Use hydrocortisone cream or calamine lotion as needed to help with itching.  General instructions    Do not scratch or rub your skin.  Put a cold, wet cloth (cold compress) on the affected areas or take baths in cool water. This will help with itching.  Avoid hot baths and showers.  Take oatmeal baths as needed. Use colloidal oatmeal. You can get this at a pharmacy or grocery store. Follow the instructions on the package.  While you have the rash, wash your clothes right after you wear them.  Check the affected area every day for signs of infection. Check for:  More redness, swelling, or pain.  Fluid or blood.  Warmth.  Pus or a bad smell.  Keep all follow-up visits. Your doctor may want to see how your skin is doing with treatment.  How is this prevented?    Poison ivy leaves.  Know what poison ivy looks like, so you can avoid it.  This plant has three leaves with flowering branches on a single stem.  The leaves are glossy.  The leaves have uneven edges that come to a point.  If you touch poison ivy, wash your skin with soap and water right away. Be sure to wash under your fingernails.  When hiking or camping, wear long pants, a long-sleeved shirt, long socks, and hiking boots. You can also use a lotion on your skin that helps to prevent contact with poison ivy.  If you think that your clothes or outdoor gear came in contact with poison ivy, rinse them off with a garden hose before you bring them inside your house.  When doing yard work or gardening, wear gloves, long sleeves, long pants, and boots. Wash your garden tools and gloves if they come in contact with poison ivy.  If you think that your pet has come into contact with poison ivy, wash them with pet shampoo and water. Make sure to wear gloves while washing your pet.  Contact a doctor if:  You have open sores in the rash area.  You have any signs of infection.  You have redness that spreads past the rash area.  You have a fever.  You have a rash over a large area of your body.  You have a rash on your eyes, mouth, or genitals.  Your rash does not get better after a few weeks.  Get help right away if:  Your face swells or your eyes swell shut.  You have trouble breathing.  You have trouble swallowing.  These symptoms may be an emergency. Do not wait to see if the symptoms will go away. Get help right away. Call 911.    This information is not intended to replace advice given to you by your health care provider. Make sure you discuss any questions you have with your health care provider.    Document Revised: 05/18/2023 Document Reviewed: 05/18/2023  [employer] Patient Education © 2024 [employer]  [employer] logo  Terms and Conditions  Privacy Policy  Editorial Policy  All content on this site: Copyright © 2024 [employer], its licensors, and contributors. All rights are reserved, including those for text and data mining, AI training, and similar technologies. For all open access content, the Creative Commons licensing terms apply.  Cookies are used by this site. To decline or learn more, visit our Cookies page.  [employer]

## 2024-06-20 NOTE — ED PROVIDER NOTE - PATIENT PORTAL LINK FT
You can access the FollowMyHealth Patient Portal offered by [facility] by registering at the following website: [email or web address]. By joining [facility]’s FollowMyHealth portal, you will also be able to view your health information using other applications (apps) compatible with our system.

## 2024-06-20 NOTE — ED PROVIDER NOTE - OBJECTIVE STATEMENT
74-year-old male presents to the emergency department with  diffuse rash associated with recent poison ivy  contact.  He was working with weights at home on Sunday developed a rash that day which is spread throughout his body.  He denies any shortness of breath no diarrhea no vomiting.  He is complaining of an itchy rash that is diffusely located throughout his body.  No new medications no other new exposures.  He has tried over-the-counter medications without relief antihistamines and this was recommended after seeing a physician at a local urgent care.

## 2024-08-19 ENCOUNTER — NON-APPOINTMENT (OUTPATIENT)
Age: 74
End: 2024-08-19

## 2024-08-20 ENCOUNTER — EMERGENCY (EMERGENCY)
Facility: HOSPITAL | Age: 74
LOS: 1 days | Discharge: HOME | End: 2024-08-20
Attending: EMERGENCY MEDICINE | Admitting: EMERGENCY MEDICINE
Payer: MEDICARE

## 2024-08-20 VITALS
HEART RATE: 56 BPM | SYSTOLIC BLOOD PRESSURE: 143 MMHG | WEIGHT: 179.02 LBS | OXYGEN SATURATION: 99 % | RESPIRATION RATE: 17 BRPM | TEMPERATURE: 98 F | HEIGHT: 70 IN | DIASTOLIC BLOOD PRESSURE: 81 MMHG

## 2024-08-20 VITALS
OXYGEN SATURATION: 100 % | SYSTOLIC BLOOD PRESSURE: 140 MMHG | HEART RATE: 64 BPM | RESPIRATION RATE: 16 BRPM | DIASTOLIC BLOOD PRESSURE: 87 MMHG

## 2024-08-20 LAB
ALBUMIN SERPL ELPH-MCNC: 4.1 G/DL — SIGNIFICANT CHANGE UP (ref 3.3–5)
ALP SERPL-CCNC: 80 U/L — SIGNIFICANT CHANGE UP (ref 40–120)
ALT FLD-CCNC: 23 U/L — SIGNIFICANT CHANGE UP (ref 10–45)
ANION GAP SERPL CALC-SCNC: 6 MMOL/L — SIGNIFICANT CHANGE UP (ref 5–17)
AST SERPL-CCNC: 20 U/L — SIGNIFICANT CHANGE UP (ref 10–40)
BASOPHILS # BLD AUTO: 0.07 K/UL — SIGNIFICANT CHANGE UP (ref 0–0.2)
BASOPHILS NFR BLD AUTO: 0.8 % — SIGNIFICANT CHANGE UP (ref 0–2)
BILIRUB SERPL-MCNC: 0.7 MG/DL — SIGNIFICANT CHANGE UP (ref 0.2–1.2)
BUN SERPL-MCNC: 24 MG/DL — HIGH (ref 7–23)
CALCIUM SERPL-MCNC: 9.1 MG/DL — SIGNIFICANT CHANGE UP (ref 8.4–10.5)
CHLORIDE SERPL-SCNC: 104 MMOL/L — SIGNIFICANT CHANGE UP (ref 96–108)
CO2 SERPL-SCNC: 30 MMOL/L — SIGNIFICANT CHANGE UP (ref 22–31)
CREAT SERPL-MCNC: 0.98 MG/DL — SIGNIFICANT CHANGE UP (ref 0.5–1.3)
EGFR: 80 ML/MIN/1.73M2 — SIGNIFICANT CHANGE UP
EOSINOPHIL # BLD AUTO: 0.33 K/UL — SIGNIFICANT CHANGE UP (ref 0–0.5)
EOSINOPHIL NFR BLD AUTO: 3.7 % — SIGNIFICANT CHANGE UP (ref 0–6)
GLUCOSE SERPL-MCNC: 101 MG/DL — HIGH (ref 70–99)
HCT VFR BLD CALC: 47.7 % — SIGNIFICANT CHANGE UP (ref 39–50)
HGB BLD-MCNC: 16 G/DL — SIGNIFICANT CHANGE UP (ref 13–17)
IMM GRANULOCYTES NFR BLD AUTO: 0.3 % — SIGNIFICANT CHANGE UP (ref 0–0.9)
LYMPHOCYTES # BLD AUTO: 1.42 K/UL — SIGNIFICANT CHANGE UP (ref 1–3.3)
LYMPHOCYTES # BLD AUTO: 15.9 % — SIGNIFICANT CHANGE UP (ref 13–44)
MCHC RBC-ENTMCNC: 30.7 PG — SIGNIFICANT CHANGE UP (ref 27–34)
MCHC RBC-ENTMCNC: 33.5 GM/DL — SIGNIFICANT CHANGE UP (ref 32–36)
MCV RBC AUTO: 91.4 FL — SIGNIFICANT CHANGE UP (ref 80–100)
MONOCYTES # BLD AUTO: 0.71 K/UL — SIGNIFICANT CHANGE UP (ref 0–0.9)
MONOCYTES NFR BLD AUTO: 8 % — SIGNIFICANT CHANGE UP (ref 2–14)
NEUTROPHILS # BLD AUTO: 6.37 K/UL — SIGNIFICANT CHANGE UP (ref 1.8–7.4)
NEUTROPHILS NFR BLD AUTO: 71.3 % — SIGNIFICANT CHANGE UP (ref 43–77)
NRBC # BLD: 0 /100 WBCS — SIGNIFICANT CHANGE UP (ref 0–0)
PLATELET # BLD AUTO: 186 K/UL — SIGNIFICANT CHANGE UP (ref 150–400)
POTASSIUM SERPL-MCNC: 4.1 MMOL/L — SIGNIFICANT CHANGE UP (ref 3.5–5.3)
POTASSIUM SERPL-SCNC: 4.1 MMOL/L — SIGNIFICANT CHANGE UP (ref 3.5–5.3)
PROT SERPL-MCNC: 7.2 G/DL — SIGNIFICANT CHANGE UP (ref 6–8.3)
RBC # BLD: 5.22 M/UL — SIGNIFICANT CHANGE UP (ref 4.2–5.8)
RBC # FLD: 14.2 % — SIGNIFICANT CHANGE UP (ref 10.3–14.5)
SODIUM SERPL-SCNC: 140 MMOL/L — SIGNIFICANT CHANGE UP (ref 135–145)
TROPONIN I, HIGH SENSITIVITY RESULT: 7.7 NG/L — SIGNIFICANT CHANGE UP
WBC # BLD: 8.93 K/UL — SIGNIFICANT CHANGE UP (ref 3.8–10.5)
WBC # FLD AUTO: 8.93 K/UL — SIGNIFICANT CHANGE UP (ref 3.8–10.5)

## 2024-08-20 PROCEDURE — 71275 CT ANGIOGRAPHY CHEST: CPT | Mod: MC

## 2024-08-20 PROCEDURE — 80053 COMPREHEN METABOLIC PANEL: CPT

## 2024-08-20 PROCEDURE — 99285 EMERGENCY DEPT VISIT HI MDM: CPT | Mod: 25

## 2024-08-20 PROCEDURE — 93005 ELECTROCARDIOGRAM TRACING: CPT

## 2024-08-20 PROCEDURE — 71275 CT ANGIOGRAPHY CHEST: CPT | Mod: 26,MC

## 2024-08-20 PROCEDURE — 36415 COLL VENOUS BLD VENIPUNCTURE: CPT

## 2024-08-20 PROCEDURE — 93010 ELECTROCARDIOGRAM REPORT: CPT

## 2024-08-20 PROCEDURE — 71045 X-RAY EXAM CHEST 1 VIEW: CPT

## 2024-08-20 PROCEDURE — 85025 COMPLETE CBC W/AUTO DIFF WBC: CPT

## 2024-08-20 PROCEDURE — 84484 ASSAY OF TROPONIN QUANT: CPT

## 2024-08-20 PROCEDURE — 71045 X-RAY EXAM CHEST 1 VIEW: CPT | Mod: 26

## 2024-08-20 PROCEDURE — 99285 EMERGENCY DEPT VISIT HI MDM: CPT

## 2024-08-20 RX ORDER — BACTERIOSTATIC SODIUM CHLORIDE 0.9 %
1000 VIAL (ML) INJECTION ONCE
Refills: 0 | Status: COMPLETED | OUTPATIENT
Start: 2024-08-20 | End: 2024-08-20

## 2024-08-20 RX ADMIN — Medication 1000 MILLILITER(S): at 16:24

## 2024-08-20 NOTE — ED ADULT TRIAGE NOTE - CHIEF COMPLAINT QUOTE
Patient presents to ED with complaint of left sided  chest pain 6/10 on pain scale that started 1 week prior.  Alert and oriented x 4. Patient went to urgent care and EKG showed bradycardia therefore referred to ED.

## 2024-08-20 NOTE — ED ADULT TRIAGE NOTE - ISOLATION TYPE:
74 y/o female admitted for an episode of acute confusion. B/C 1/13/17, 1/14/17, 1/15/17, 1/16/17 positive for MRSA, repeated 1/16/17 will follow. No obvious source of bacteremia (no hardware, no skin breakdown, no cellulitis, no wounds, no trauma, no central lines, no joint or back pain).    - Last Vancomycin trough 15.1, trough goal 15-20, will continue same dose for now  - B/C repeated today, will follow  - MIS 1/17/17 negative for endocarditis  - consider imaging of spine looking for abscess/discitis  - seems there is no good source control at this time       None

## 2024-08-20 NOTE — ED ADULT NURSE NOTE - OBJECTIVE STATEMENT
Pt came from urgent care for c/o chest pain x 1 week. Pt with hx prediabetes. Reports recent heavy lifting. Left sided chest pain worsened with activity and associated with SOB. Pt denies taking any pain meds. Was sent from the urgent care for EKG showing sinus taryn. Pt was last seen by cardiologist last year and states that "everything was fine. "

## 2024-08-20 NOTE — ED PROVIDER NOTE - PATIENT PORTAL LINK FT
Continue Macrobid as initiated in ED.    
Continue Macrobid as initiated in ED.    
Discharge home with c-collar and neurosurgeon follow-up as soon as possible.    
MRI cervical and lumbar spine. PT/OT evaluation and treatment.    
Neurosurgeon follow-up as soon as possible    
PT/OT evaluation and treatment.    
You can access the FollowMyHealth Patient Portal offered by [facility] by registering at the following website: [email or web address]. By joining [facility]’s FollowMyHealth portal, you will also be able to view your health information using other applications (apps) compatible with our system.

## 2024-08-20 NOTE — ED PROVIDER NOTE - CARE PROVIDER_API CALL
Shiva Polanco  Cardiovascular Disease  [address], [address]  [place] [address]  Phone: [phone]  Fax: [phone]  Follow Up Time: 1-3 Days

## 2024-08-20 NOTE — ED PROVIDER NOTE - NSFOLLOWUPINSTRUCTIONS_ED_ALL_ED_FT
1. Take Tylenol 650mg every 4-6 hours for 5 days.  1.  Apply Lidocaine patch to the affected area as prescribed over the counter for 5 days.

## 2024-09-01 NOTE — CHART NOTE - NSCHARTNOTEFT_GEN_A_CORE
Pt is a 75 y/o who presented to ED for chest pain.  Pt could not be reached to inquire about follow up appointment.

## 2024-09-03 ENCOUNTER — EMERGENCY (EMERGENCY)
Facility: HOSPITAL | Age: 74
LOS: 1 days | Discharge: HOME | End: 2024-09-03
Attending: EMERGENCY MEDICINE | Admitting: EMERGENCY MEDICINE
Payer: MEDICARE

## 2024-09-03 VITALS
RESPIRATION RATE: 17 BRPM | WEIGHT: 177.91 LBS | HEART RATE: 58 BPM | SYSTOLIC BLOOD PRESSURE: 169 MMHG | OXYGEN SATURATION: 97 % | DIASTOLIC BLOOD PRESSURE: 101 MMHG | TEMPERATURE: 98 F | HEIGHT: 70 IN

## 2024-09-03 PROCEDURE — 73130 X-RAY EXAM OF HAND: CPT | Mod: 26,LT

## 2024-09-03 PROCEDURE — 73130 X-RAY EXAM OF HAND: CPT

## 2024-09-03 PROCEDURE — 99283 EMERGENCY DEPT VISIT LOW MDM: CPT

## 2024-09-03 PROCEDURE — 99284 EMERGENCY DEPT VISIT MOD MDM: CPT

## 2024-09-03 NOTE — ED PROVIDER NOTE - PHYSICAL EXAMINATION
Gen:  Well appearing in NAD  Head:  NC/AT  Resp: No distress   Ext: no deformities  Tender left 2nd MCP joint with local swelling. Unable to fully flex digit about that joint. Distal neurovasc intact.   Skin: warm and dry as visualized

## 2024-09-03 NOTE — ED PROVIDER NOTE - OBJECTIVE STATEMENT
74-year-old male presents to the ED reporting a left knuckle pain and swelling with limited range of motion after playing hockey about 5 days ago with his grandchild.  Patient states no prior injuries.  He has inability to make full flexion of that digit.  No numbness or tingling.

## 2024-09-03 NOTE — ED PROVIDER NOTE - NSICDXPASTSURGICALHX_GEN_ALL_CORE_FT
.      CERTIFICATE OF RETURN TO WORK    May 12, 2017      Re:   Jarrett Byrd  [address] [address]                        This is to certify that Jarrett Byrd has been under my care from 5/12/2017 and can return to light work on 5/13/2017    RESTRICTIONS: Limit lifting to no more than 15#  Minimal twisting, bending, kneeling and squatting.  Limit walking to no more than 15 minutes out of every hour.        SIGNATURE:___________________________________________,   5/12/2017      SHERLEY Benoit           [facility]  [facility] [place]  [address] [phone]  Ext. [phone]  
PAST SURGICAL HISTORY:  No significant past surgical history

## 2024-09-03 NOTE — ED PROVIDER NOTE - CLINICAL SUMMARY MEDICAL DECISION MAKING FREE TEXT BOX
74-year-old male presents to the ED reporting a left knuckle pain and swelling with limited range of motion after playing hockey about 5 days ago with his grandchild.  Patient states no prior injuries.  He has inability to make full flexion of that digit.  No numbness or tingling.  Exam as stated. No acute fx seen on xray. recc f/u with pcp or hand.

## 2024-09-03 NOTE — ED PROVIDER NOTE - NSFOLLOWUPINSTRUCTIONS_ED_ALL_ED_FT
Contusion    A contusion is a deep bruise. Contusions are the result of a blunt injury to tissues and muscle fibers under the skin. The skin overlying the contusion may turn blue, purple, or yellow. Symptoms also include pain and swelling in the injured area.    SEEK IMMEDIATE MEDICAL CARE IF YOU HAVE ANY OF THE FOLLOWING SYMPTOMS: severe pain, numbness, tingling, pain, weakness, or skin color/temperature change in any part of your body distal to the injury.     In the Emergency Department today, we did not appreciate any abnormal findings on your xray. We will submit to our radiologist for FINAL review. If there are any new findings or concerning findings that were missed in the Emergency Department, we will notify you at the number provided upon registration.

## 2024-09-03 NOTE — ED PROVIDER NOTE - PATIENT PORTAL LINK FT
You can access the FollowMyHealth Patient Portal offered by [facility] by registering at the following website: [email or web address]. By joining [employer]’s FollowMyHealth portal, you will also be able to view your health information using other applications (apps) compatible with our system.

## 2024-09-10 ENCOUNTER — APPOINTMENT (OUTPATIENT)
Dept: ORTHOPEDIC SURGERY | Facility: CLINIC | Age: 74
End: 2024-09-10
Payer: MEDICARE

## 2024-09-10 VITALS
BODY MASS INDEX: 24.92 KG/M2 | HEIGHT: 71 IN | SYSTOLIC BLOOD PRESSURE: 149 MMHG | HEART RATE: 68 BPM | DIASTOLIC BLOOD PRESSURE: 88 MMHG | WEIGHT: 178 LBS

## 2024-09-10 DIAGNOSIS — S63.651A SPRAIN OF METACARPOPHALANGEAL JOINT OF LEFT INDEX FINGER, INITIAL ENCOUNTER: ICD-10-CM

## 2024-09-10 PROCEDURE — 99213 OFFICE O/P EST LOW 20 MIN: CPT

## 2024-09-10 NOTE — HISTORY OF PRESENT ILLNESS
[Right] : right hand dominant [FreeTextEntry1] : He comes in today for evaluation of left index finger injury that occurred after tripping on a trench about 2 weeks ago. He went to the ER, where he had x-rays. He reports swelling and an inability to bend the finger.  He is currently taking metformin.  He is prediabetic.

## 2024-09-10 NOTE — DISCUSSION/SUMMARY
[FreeTextEntry1] :  He has findings consistent with a left index finger MCP joint sprain after an injury 2 weeks ago.   I had a discussion with the patient regarding today's visit, the prognosis of this diagnosis, and treatment recommendations and options. At this time, I recommended he begin buddy taping the index and middle fingers as needed. I also instructed him on icing, taking oral anti-inflammatory as needed, and gentle range of motion exercises. If his symptoms have not improved over the next 3-4 weeks, he will call my office and I will order an MRI to better evaluate his injury.  The patient has agreed to the above plan of management and has expressed full understanding.  All questions were fully answered to the patient's satisfaction.  My cumulative time spent on this visit included: Preparation for the visit, review of the medical records, review of pertinent diagnostic studies, examination and counseling of the patient on the above diagnosis, treatment plan and prognosis, orders of diagnostic tests, medication and/or appropriate procedures and documentation in the medical records of today's visit.

## 2024-09-10 NOTE — PHYSICAL EXAM
[de-identified] : - Constitutional: This is a male in no obvious distress.   - Psych: Patient is alert and oriented to person, place and time.  Patient has a normal mood and affect.  - Cardiovascular: Normal pulses throughout the upper extremities.  No significant varicosities are noted in the upper extremities.  - Neuro: Strength and sensation are intact throughout the upper extremities.  Patient has normal coordination.  - Respiratory:  Patient exhibits no evidence of shortness of breath or difficulty breathing.  - Skin: No rashes, lesions, or other abnormalities are noted in the upper extremities.  ---   Examination of his left index finger demonstrates mild swelling along the MCP joint dorsally.  There is mild tenderness along the MCP joint ulnar collateral ligament.  He has some limitation of terminal flexion and extension with soft endpoints.  There is no instability of the MCP joint radial or ulnar collateral ligaments or extensor tendon.  He is neurovascularly intact distally.          [de-identified] : I reviewed x-rays of his left hand dated 9/3/24 which demonstrate no fracture or dislocation. There is what appears to be an exostosis emanating from the ring finger proximal phalanx distally, mild degenerative changes at the PIP and DIP joints of the digits.

## 2024-09-10 NOTE — ADDENDUM
[FreeTextEntry1] :  I, Patrick Hewitt, acted solely as a scribe for Dr. Beltran on this date on 09/10/2024.

## 2024-09-10 NOTE — END OF VISIT
[FreeTextEntry3] : This note was written by Patrick Hewitt on 09/10/2024 acting solely as a scribe for Dr. Ted Beltran.   All medical record entries made by the Scribe were at my, Dr. Ted Beltran, direction and personally dictated by me on 09/10/2024. I have personally reviewed the chart and agree that the record accurately reflects my personal performance of the history, physical exam, assessment and plan.

## 2024-10-03 ENCOUNTER — EMERGENCY (EMERGENCY)
Facility: HOSPITAL | Age: 74
LOS: 1 days | Discharge: HOME | End: 2024-10-03
Attending: EMERGENCY MEDICINE | Admitting: EMERGENCY MEDICINE
Payer: MEDICARE

## 2024-10-03 VITALS
RESPIRATION RATE: 18 BRPM | OXYGEN SATURATION: 97 % | DIASTOLIC BLOOD PRESSURE: 71 MMHG | WEIGHT: 177.91 LBS | HEIGHT: 70 IN | TEMPERATURE: 99 F | SYSTOLIC BLOOD PRESSURE: 167 MMHG | HEART RATE: 70 BPM

## 2024-10-03 PROCEDURE — 99284 EMERGENCY DEPT VISIT MOD MDM: CPT

## 2024-10-03 PROCEDURE — 73562 X-RAY EXAM OF KNEE 3: CPT | Mod: 26,LT

## 2024-11-20 PROCEDURE — 73562 X-RAY EXAM OF KNEE 3: CPT

## 2024-11-20 PROCEDURE — 99283 EMERGENCY DEPT VISIT LOW MDM: CPT

## 2024-11-27 ENCOUNTER — EMERGENCY (EMERGENCY)
Facility: HOSPITAL | Age: 74
LOS: 1 days | Discharge: HOME | End: 2024-11-27
Attending: STUDENT IN AN ORGANIZED HEALTH CARE EDUCATION/TRAINING PROGRAM | Admitting: STUDENT IN AN ORGANIZED HEALTH CARE EDUCATION/TRAINING PROGRAM
Payer: MEDICARE

## 2024-11-27 VITALS
HEART RATE: 67 BPM | OXYGEN SATURATION: 98 % | HEIGHT: 70 IN | DIASTOLIC BLOOD PRESSURE: 93 MMHG | RESPIRATION RATE: 16 BRPM | TEMPERATURE: 98 F | SYSTOLIC BLOOD PRESSURE: 162 MMHG | WEIGHT: 179.02 LBS

## 2024-11-27 PROCEDURE — 90471 IMMUNIZATION ADMIN: CPT

## 2024-11-27 PROCEDURE — 99284 EMERGENCY DEPT VISIT MOD MDM: CPT

## 2024-11-27 PROCEDURE — 90715 TDAP VACCINE 7 YRS/> IM: CPT

## 2024-11-27 PROCEDURE — 99283 EMERGENCY DEPT VISIT LOW MDM: CPT | Mod: 25

## 2024-11-27 PROCEDURE — 73660 X-RAY EXAM OF TOE(S): CPT

## 2024-11-27 PROCEDURE — 73660 X-RAY EXAM OF TOE(S): CPT | Mod: 26,LT

## 2024-11-27 RX ORDER — CLOSTRIDIUM TETANI TOXOID ANTIGEN (FORMALDEHYDE INACTIVATED), CORYNEBACTERIUM DIPHTHERIAE TOXOID ANTIGEN (FORMALDEHYDE INACTIVATED), BORDETELLA PERTUSSIS TOXOID ANTIGEN (GLUTARALDEHYDE INACTIVATED), BORDETELLA PERTUSSIS FILAMENTOUS HEMAGGLUTININ ANTIGEN (FORMALDEHYDE INACTIVATED), BORDETELLA PERTUSSIS PERTACTIN ANTIGEN, AND BORDETELLA PERTUSSIS FIMBRIAE 2/3 ANTIGEN 5; 2; 2.5; 5; 3; 5 [LF]/.5ML; [LF]/.5ML; UG/.5ML; UG/.5ML; UG/.5ML; UG/.5ML
0.5 INJECTION, SUSPENSION INTRAMUSCULAR ONCE
Refills: 0 | Status: COMPLETED | OUTPATIENT
Start: 2024-11-27 | End: 2024-11-27

## 2024-11-27 RX ORDER — FIRST AID ANTIBIOTIC 500 [USP'U]/G
1 OINTMENT TOPICAL ONCE
Refills: 0 | Status: COMPLETED | OUTPATIENT
Start: 2024-11-27 | End: 2024-11-27

## 2024-11-27 RX ADMIN — CLOSTRIDIUM TETANI TOXOID ANTIGEN (FORMALDEHYDE INACTIVATED), CORYNEBACTERIUM DIPHTHERIAE TOXOID ANTIGEN (FORMALDEHYDE INACTIVATED), BORDETELLA PERTUSSIS TOXOID ANTIGEN (GLUTARALDEHYDE INACTIVATED), BORDETELLA PERTUSSIS FILAMENTOUS HEMAGGLUTININ ANTIGEN (FORMALDEHYDE INACTIVATED), BORDETELLA PERTUSSIS PERTACTIN ANTIGEN, AND BORDETELLA PERTUSSIS FIMBRIAE 2/3 ANTIGEN 0.5 MILLILITER(S): 5; 2; 2.5; 5; 3; 5 INJECTION, SUSPENSION INTRAMUSCULAR at 10:54

## 2024-11-27 RX ADMIN — FIRST AID ANTIBIOTIC 1 APPLICATION(S): 500 OINTMENT TOPICAL at 10:59

## 2024-11-27 NOTE — ED PROVIDER NOTE - PATIENT PORTAL LINK FT
You can access the FollowMyHealth Patient Portal offered by [facility] by registering at the following website: [email or web address]. By joining [employer]’s FollowMyHealth portal, you will also be able to view your health information using other applications (apps) compatible with our system.

## 2024-11-27 NOTE — ED ADULT NURSE NOTE - NS PRO PASSIVE SMOKE EXP
Subjective   Patient ID: Rogelio is a 47 year old male.    Chief Complaint   Patient presents with   • PrePlacement Physical     HPI    Review of Systems    Objective   Physical Exam    Assessment    1. Health maintenance examination  2. Encounter for laboratory  3. Obesity  4. Need for Tdap vaccine  5. Administrative encounter  6. Refused the influenza vaccone     No

## 2024-11-27 NOTE — ED PROVIDER NOTE - PHYSICAL EXAMINATION
VITAL SIGNS: I have reviewed nursing notes and confirm.  CONSTITUTIONAL: well-appearing, non-toxic, NAD  SKIN: Warm dry, normal skin turgor left 3rd toenail avulsion injury detached from proximal base   HEAD: NCAT  EXT: Full ROM, no bony tenderness, no pedal edema, no calf tenderness  NEURO: normal motor. normal sensory.  Normal gait.  PSYCH: Cooperative, appropriate.

## 2024-11-27 NOTE — ED ADULT NURSE NOTE - NSFALLHARMRISKINTERV_ED_ALL_ED

## 2024-11-27 NOTE — ED PROVIDER NOTE - CLINICAL SUMMARY MEDICAL DECISION MAKING FREE TEXT BOX
74-year-old male with history of prediabetes on metformin presenting to the ED with left third toe injury status post moving Piano when toe was injured by leg of piano.  Patient with reported nail avulsion injury, unknown last Tdap, denies any limited range of motion, no bruising or numbness.  No other reported complaints.  Local wound care, nail avulsed from proximal nailbed, will apply bacitracin Xeroform and dressed, patient advised to follow-up with podiatry, given his history of prediabetes will cover with antibiotics to prevent infection.  Patient verbalized understanding and agreeable with discharge plan.

## 2024-11-27 NOTE — ED PROVIDER NOTE - NSFOLLOWUPINSTRUCTIONS_ED_ALL_ED_FT
Nail Avulsion    WHAT YOU NEED TO KNOW:    Nail avulsion is when part or all of a nail is torn away or removed from the nail bed. Avulsion may happen on your finger or toe. Common causes include ingrown nail, injury, or infection. The nail bed will form a hard layer and then a new nail may grow. The nail bed will be sensitive until the hard layer forms. You will need to keep it covered to prevent infection or more injury. You may need to care for your nail area for several months as the new nail grows.    DISCHARGE INSTRUCTIONS:    Return to the emergency department if:     Blood soaks through your bandage.      You have a red streak running up your leg or arm.    Contact your healthcare provider if:     You have a fever or chills.      Your injured area is red, swollen, or draining pus.       You have new or worsening pain, or pain that does not get better with medicine.      You have questions or concerns about your condition or care.    Medicines:     Antibiotics may help treat or prevent a bacterial infection.      Acetaminophen decreases pain and fever. It is available without a doctor's order. Ask how much to take and how often to take it. Follow directions. Acetaminophen can cause liver damage if not taken correctly.      NSAIDs, such as ibuprofen, help decrease swelling, pain, and fever. This medicine is available with or without a doctor's order. NSAIDs can cause stomach bleeding or kidney problems in certain people. If you take blood thinner medicine, always ask your healthcare provider if NSAIDs are safe for you. Always read the medicine label and follow directions.      Take your medicine as directed. Contact your healthcare provider if you think your medicine is not helping or if you have side effects. Tell him or her if you are allergic to any medicine. Keep a list of the medicines, vitamins, and herbs you take. Include the amounts, and when and why you take them. Bring the list or the pill bottles to follow-up visits. Carry your medicine list with you in case of an emergency.    Self-care:     Keep your nail area clean, dry, and covered. When you are allowed to bathe, carefully wash the area with soap and water. Put on a clean, new bandage. Do not use an adhesive bandage. It may stick to the wound and cause pain when you remove it. Ask your healthcare provider what kind of bandage to use. Change your bandage when it gets wet or dirty. Your healthcare provider may suggest that you change the bandage every 24 hours for the first few days.      Elevate your hand or foot above the level of your heart as often as you can for 24 hours. This will help decrease swelling and pain. Prop your hand or foot on pillows or blankets to keep it elevated comfortably.       Apply ice on your wound area for 15 to 20 minutes every hour or as directed. Use an ice pack, or put crushed ice in a plastic bag. Cover it with a towel. Ice helps prevent tissue damage and decreases swelling and pain.      Do not wear tight shoes or shoes that do not fit well. Do not wear tights or pantyhose. Wear cotton socks.      Ask when you can return to work, school, or your usual sports and activities.    Follow up with your healthcare provider as directed: You may be referred to a hand or foot specialist. Write down your questions so you remember to ask them during your visits.        © Copyright [employer] 2019 All illustrations and images included in CareNotes are the copyrighted property of [employer] or [employer].

## 2024-11-27 NOTE — ED ADULT NURSE NOTE - COMFORT/ACCEPTABLE PAIN LEVEL (0-10)
[de-identified] : The patient presents with a history of snoring, worse winter/cold season, now still snoring but not as loud,mouth breathing, no more GASPING and witnessed apnea at night when sleeping. This is chronic and happens at least 3 times a week. Failed a recent hearing screen with PCP, losses focus in classroom.\par \par THERE IS KNOWN FATIGUE. There are rare CONCERNS WITH ENURESIS but now potty trained.\par \par THERE IS NO Known growth restriction. There is NO ASTHMA.\par \par No throat/tonsil infections. \par \par No problems with ear infections, swallowing or with VPI/Speech/nasal regurgitation.\par \par Passed NBHT AU.\par \par Full term,  uncomplicated delivery with uncomplicated pregnancy.\par \par No cyanosis, no ETT intubation, no home oxygen requirement, no NICU stay 
1

## 2024-11-27 NOTE — ED ADULT TRIAGE NOTE - BP NONINVASIVE SYSTOLIC (MM HG)
Initiate Treatment: Compound Hydroquinone 6%, hydrocortisone2.5%, and Tretinoin 0.05% every night to face x 3 months Detail Level: Zone Continue Regimen: Clobetasol 0.05 % topical ointment as needed 162

## 2024-11-28 RX ORDER — CEPHALEXIN 125 MG/5ML
1 SUSPENSION, RECONSTITUTED, ORAL (ML) ORAL
Qty: 10 | Refills: 0
Start: 2024-11-28 | End: 2024-12-02

## 2024-11-29 NOTE — CHART NOTE - NSCHARTNOTEFT_GEN_A_CORE
74 y o male presenting to the ED on 11/27 with toe pain as per chart.  ED schedule coordinator assisted with scheduling the recommended podiatry follow up appointment on 11/29 @ 10 am with Dr. Heraclio Espinoza.

## 2024-12-28 ENCOUNTER — NON-APPOINTMENT (OUTPATIENT)
Age: 74
End: 2024-12-28

## 2025-01-13 ENCOUNTER — NON-APPOINTMENT (OUTPATIENT)
Age: 75
End: 2025-01-13

## 2025-03-10 ENCOUNTER — EMERGENCY (EMERGENCY)
Facility: HOSPITAL | Age: 75
LOS: 1 days | Discharge: HOME | End: 2025-03-10
Attending: EMERGENCY MEDICINE | Admitting: EMERGENCY MEDICINE
Payer: SELF-PAY

## 2025-03-10 VITALS
SYSTOLIC BLOOD PRESSURE: 158 MMHG | RESPIRATION RATE: 18 BRPM | HEART RATE: 70 BPM | TEMPERATURE: 98 F | OXYGEN SATURATION: 99 % | HEIGHT: 71 IN | DIASTOLIC BLOOD PRESSURE: 82 MMHG | WEIGHT: 179.02 LBS

## 2025-03-10 PROCEDURE — 72125 CT NECK SPINE W/O DYE: CPT | Mod: MC

## 2025-03-10 PROCEDURE — 99284 EMERGENCY DEPT VISIT MOD MDM: CPT | Mod: 25

## 2025-03-10 PROCEDURE — 73130 X-RAY EXAM OF HAND: CPT

## 2025-03-10 PROCEDURE — 72125 CT NECK SPINE W/O DYE: CPT | Mod: 26

## 2025-03-10 PROCEDURE — 70450 CT HEAD/BRAIN W/O DYE: CPT | Mod: 26

## 2025-03-10 PROCEDURE — 73130 X-RAY EXAM OF HAND: CPT | Mod: 26,LT

## 2025-03-10 PROCEDURE — 70450 CT HEAD/BRAIN W/O DYE: CPT | Mod: MC

## 2025-03-10 PROCEDURE — 99285 EMERGENCY DEPT VISIT HI MDM: CPT

## 2025-03-10 RX ORDER — ACETAMINOPHEN 500 MG/5ML
975 LIQUID (ML) ORAL ONCE
Refills: 0 | Status: COMPLETED | OUTPATIENT
Start: 2025-03-10 | End: 2025-03-10

## 2025-03-10 RX ADMIN — Medication 975 MILLIGRAM(S): at 14:39

## 2025-03-10 RX ADMIN — Medication 975 MILLIGRAM(S): at 15:09

## 2025-03-10 NOTE — ED PROVIDER NOTE - PHYSICAL EXAMINATION
Gen: alert, NAD  HEENT:  NC/AT, PERRLA  CV:  well perfused  Pulm:  normal RR, breathing comfortably  MSK: moving all extremities, L hand w full rang of motion, no swelling  Neuro:  non-focal  Skin:  visualized areas intact  Psych: AOx3

## 2025-03-10 NOTE — ED PROVIDER NOTE - PATIENT PORTAL LINK FT
You can access the FollowMyHealth Patient Portal offered by [facility] by registering at the following website: [email or web address]. By joining [employer]’s FollowMyHealth portal, you will also be able to view your health information using other applications (apps) compatible with our system.
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
## 2025-03-10 NOTE — ED PROVIDER NOTE - NSFOLLOWUPINSTRUCTIONS_ED_ALL_ED_FT
-- You should update your primary care physician on your Emergency Department visit and follow up with them.  If you do not have a physician or have difficulty following up, please call: [phone] to obtain a [facility] doctor or specialist who can provide follow up.    -- Return to the ER for worsening or persistent symptoms, and/or ANY NEW OR CONCERNING SYMPTOMS.    -- Take ibuprofen 400 mg every 6 hours with food, as needed for pain    -- Take tylenol 650 mg every 4 hours, as needed for pain
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
## 2025-04-27 NOTE — ED ADULT NURSE NOTE - ISAR MEMORY
No

## 2025-06-07 NOTE — ED PROVIDER NOTE - WR INTERPRETATION DATE TIME  1
- 22-Nov-2023 23:21

## 2025-08-13 ENCOUNTER — NON-APPOINTMENT (OUTPATIENT)
Age: 75
End: 2025-08-13

## 2025-09-17 ENCOUNTER — NON-APPOINTMENT (OUTPATIENT)
Age: 75
End: 2025-09-17